# Patient Record
Sex: MALE | Race: WHITE | NOT HISPANIC OR LATINO | Employment: FULL TIME | URBAN - METROPOLITAN AREA
[De-identification: names, ages, dates, MRNs, and addresses within clinical notes are randomized per-mention and may not be internally consistent; named-entity substitution may affect disease eponyms.]

---

## 2023-03-14 LAB
ALANINE AMINOTRANSFERASE (SGPT) (U/L) IN SER/PLAS: 39 U/L (ref 10–52)
ALBUMIN (G/DL) IN SER/PLAS: 4.1 G/DL (ref 3.4–5)
ALKALINE PHOSPHATASE (U/L) IN SER/PLAS: 61 U/L (ref 33–120)
ANION GAP IN SER/PLAS: 12 MMOL/L (ref 10–20)
ASPARTATE AMINOTRANSFERASE (SGOT) (U/L) IN SER/PLAS: 20 U/L (ref 9–39)
BASOPHILS (10*3/UL) IN BLOOD BY AUTOMATED COUNT: 0.02 X10E9/L (ref 0–0.1)
BASOPHILS/100 LEUKOCYTES IN BLOOD BY AUTOMATED COUNT: 0.4 % (ref 0–2)
BILIRUBIN TOTAL (MG/DL) IN SER/PLAS: 0.6 MG/DL (ref 0–1.2)
CALCIUM (MG/DL) IN SER/PLAS: 8.8 MG/DL (ref 8.6–10.3)
CARBON DIOXIDE, TOTAL (MMOL/L) IN SER/PLAS: 25 MMOL/L (ref 21–32)
CHLORIDE (MMOL/L) IN SER/PLAS: 105 MMOL/L (ref 98–107)
CREATININE (MG/DL) IN SER/PLAS: 0.73 MG/DL (ref 0.5–1.3)
EOSINOPHILS (10*3/UL) IN BLOOD BY AUTOMATED COUNT: 0.07 X10E9/L (ref 0–0.7)
EOSINOPHILS/100 LEUKOCYTES IN BLOOD BY AUTOMATED COUNT: 1.3 % (ref 0–6)
ERYTHROCYTE DISTRIBUTION WIDTH (RATIO) BY AUTOMATED COUNT: 13.2 % (ref 11.5–14.5)
ERYTHROCYTE MEAN CORPUSCULAR HEMOGLOBIN CONCENTRATION (G/DL) BY AUTOMATED: 32.6 G/DL (ref 32–36)
ERYTHROCYTE MEAN CORPUSCULAR VOLUME (FL) BY AUTOMATED COUNT: 87 FL (ref 80–100)
ERYTHROCYTES (10*6/UL) IN BLOOD BY AUTOMATED COUNT: 4.78 X10E12/L (ref 4.5–5.9)
GFR MALE: >90 ML/MIN/1.73M2
GLUCOSE (MG/DL) IN SER/PLAS: 98 MG/DL (ref 74–99)
HEMATOCRIT (%) IN BLOOD BY AUTOMATED COUNT: 41.7 % (ref 41–52)
HEMOGLOBIN (G/DL) IN BLOOD: 13.6 G/DL (ref 13.5–17.5)
IMMATURE GRANULOCYTES/100 LEUKOCYTES IN BLOOD BY AUTOMATED COUNT: 0.2 % (ref 0–0.9)
LEUKOCYTES (10*3/UL) IN BLOOD BY AUTOMATED COUNT: 5.5 X10E9/L (ref 4.4–11.3)
LYMPHOCYTES (10*3/UL) IN BLOOD BY AUTOMATED COUNT: 1.69 X10E9/L (ref 1.2–4.8)
LYMPHOCYTES/100 LEUKOCYTES IN BLOOD BY AUTOMATED COUNT: 30.5 % (ref 13–44)
MONOCYTES (10*3/UL) IN BLOOD BY AUTOMATED COUNT: 0.42 X10E9/L (ref 0.1–1)
MONOCYTES/100 LEUKOCYTES IN BLOOD BY AUTOMATED COUNT: 7.6 % (ref 2–10)
NEUTROPHILS (10*3/UL) IN BLOOD BY AUTOMATED COUNT: 3.33 X10E9/L (ref 1.2–7.7)
NEUTROPHILS/100 LEUKOCYTES IN BLOOD BY AUTOMATED COUNT: 60 % (ref 40–80)
PLATELETS (10*3/UL) IN BLOOD AUTOMATED COUNT: 242 X10E9/L (ref 150–450)
POTASSIUM (MMOL/L) IN SER/PLAS: 3.9 MMOL/L (ref 3.5–5.3)
PROTEIN TOTAL: 6.6 G/DL (ref 6.4–8.2)
SODIUM (MMOL/L) IN SER/PLAS: 138 MMOL/L (ref 136–145)
UREA NITROGEN (MG/DL) IN SER/PLAS: 14 MG/DL (ref 6–23)

## 2023-03-16 LAB — STAPH/MRSA SCREEN, CULTURE: NORMAL

## 2023-03-20 LAB
ESTIMATED AVERAGE GLUCOSE FOR HBA1C: 120 MG/DL
HEMOGLOBIN A1C/HEMOGLOBIN TOTAL IN BLOOD: 5.8 %

## 2023-03-24 LAB — SARS-COV-2 RESULT: NOT DETECTED

## 2023-03-28 ENCOUNTER — HOSPITAL ENCOUNTER (OUTPATIENT)
Dept: DATA CONVERSION | Facility: HOSPITAL | Age: 58
End: 2023-03-28
Attending: ORTHOPAEDIC SURGERY | Admitting: ORTHOPAEDIC SURGERY
Payer: COMMERCIAL

## 2023-03-28 DIAGNOSIS — M17.10 UNILATERAL PRIMARY OSTEOARTHRITIS, UNSPECIFIED KNEE: ICD-10-CM

## 2023-03-28 DIAGNOSIS — E66.9 OBESITY, UNSPECIFIED: ICD-10-CM

## 2023-03-28 DIAGNOSIS — Z79.82 LONG TERM (CURRENT) USE OF ASPIRIN: ICD-10-CM

## 2023-03-28 DIAGNOSIS — M17.11 UNILATERAL PRIMARY OSTEOARTHRITIS, RIGHT KNEE: ICD-10-CM

## 2023-03-28 LAB
POCT GLUCOSE: 109 MG/DL (ref 74–99)
POCT GLUCOSE: 118 MG/DL (ref 74–99)

## 2023-03-29 LAB
ANION GAP IN SER/PLAS: NORMAL
BASOPHILS (10*3/UL) IN BLOOD BY AUTOMATED COUNT: NORMAL
BASOPHILS/100 LEUKOCYTES IN BLOOD BY AUTOMATED COUNT: NORMAL
CALCIUM (MG/DL) IN SER/PLAS: NORMAL
CARBON DIOXIDE, TOTAL (MMOL/L) IN SER/PLAS: NORMAL
CHLORIDE (MMOL/L) IN SER/PLAS: NORMAL
CREATININE (MG/DL) IN SER/PLAS: NORMAL
EOSINOPHILS (10*3/UL) IN BLOOD BY AUTOMATED COUNT: NORMAL
EOSINOPHILS/100 LEUKOCYTES IN BLOOD BY AUTOMATED COUNT: NORMAL
ERYTHROCYTE DISTRIBUTION WIDTH (RATIO) BY AUTOMATED COUNT: NORMAL
ERYTHROCYTE MEAN CORPUSCULAR HEMOGLOBIN CONCENTRATION (G/DL) BY AUTOMATED: NORMAL
ERYTHROCYTE MEAN CORPUSCULAR VOLUME (FL) BY AUTOMATED COUNT: NORMAL
ERYTHROCYTES (10*6/UL) IN BLOOD BY AUTOMATED COUNT: NORMAL
GFR FEMALE: NORMAL
GFR MALE: NORMAL
GLUCOSE (MG/DL) IN SER/PLAS: NORMAL
HEMATOCRIT (%) IN BLOOD BY AUTOMATED COUNT: NORMAL
HEMOGLOBIN (G/DL) IN BLOOD: NORMAL
IMMATURE GRANULOCYTES/100 LEUKOCYTES IN BLOOD BY AUTOMATED COUNT: NORMAL
LEUKOCYTES (10*3/UL) IN BLOOD BY AUTOMATED COUNT: NORMAL
LYMPHOCYTES (10*3/UL) IN BLOOD BY AUTOMATED COUNT: NORMAL
LYMPHOCYTES/100 LEUKOCYTES IN BLOOD BY AUTOMATED COUNT: NORMAL
MANUAL DIFFERENTIAL Y/N: NORMAL
MONOCYTES (10*3/UL) IN BLOOD BY AUTOMATED COUNT: NORMAL
MONOCYTES/100 LEUKOCYTES IN BLOOD BY AUTOMATED COUNT: NORMAL
NEUTROPHILS (10*3/UL) IN BLOOD BY AUTOMATED COUNT: NORMAL
NEUTROPHILS/100 LEUKOCYTES IN BLOOD BY AUTOMATED COUNT: NORMAL
NRBC (PER 100 WBCS) BY AUTOMATED COUNT: NORMAL
PLATELETS (10*3/UL) IN BLOOD AUTOMATED COUNT: NORMAL
POTASSIUM (MMOL/L) IN SER/PLAS: NORMAL
SODIUM (MMOL/L) IN SER/PLAS: NORMAL
UREA NITROGEN (MG/DL) IN SER/PLAS: NORMAL

## 2023-09-07 VITALS — HEIGHT: 69 IN | WEIGHT: 253.31 LBS | BODY MASS INDEX: 37.52 KG/M2

## 2023-09-14 NOTE — H&P
History & Physical Reviewed:   I have reviewed the History and Physical dated:  14-Mar-2023   History and Physical reviewed and relevant findings noted. Patient examined to review pertinent physical  findings.: No significant changes   Home Medications Reviewed: no changes noted   Allergies Reviewed: no changes noted       ERAS (Enhanced Recovery After Surgery):  ·  ERAS Patient: no     Consent:   COVID-19 Consent:  ·  COVID-19 Risk Consent Surgeon has reviewed key risks related to the risk of jacklyn COVID-19 and if they contract COVID-19 what the risks are.       Electronic Signatures:  Valreiano Beatty)  (Signed 27-Mar-2023 13:09)   Authored: History & Physical Reviewed, ERAS, Consent,  Note Completion      Last Updated: 27-Mar-2023 13:09 by Valeriano Beatty)

## 2023-09-28 LAB
ANION GAP IN SER/PLAS: 12 MMOL/L (ref 10–20)
BASOPHILS (10*3/UL) IN BLOOD BY AUTOMATED COUNT: 0.02 X10E9/L (ref 0–0.1)
BASOPHILS/100 LEUKOCYTES IN BLOOD BY AUTOMATED COUNT: 0.3 % (ref 0–2)
CALCIUM (MG/DL) IN SER/PLAS: 9.1 MG/DL (ref 8.6–10.3)
CARBON DIOXIDE, TOTAL (MMOL/L) IN SER/PLAS: 26 MMOL/L (ref 21–32)
CHLORIDE (MMOL/L) IN SER/PLAS: 103 MMOL/L (ref 98–107)
CREATININE (MG/DL) IN SER/PLAS: 0.78 MG/DL (ref 0.5–1.3)
EOSINOPHILS (10*3/UL) IN BLOOD BY AUTOMATED COUNT: 0.09 X10E9/L (ref 0–0.7)
EOSINOPHILS/100 LEUKOCYTES IN BLOOD BY AUTOMATED COUNT: 1.6 % (ref 0–6)
ERYTHROCYTE DISTRIBUTION WIDTH (RATIO) BY AUTOMATED COUNT: 14 % (ref 11.5–14.5)
ERYTHROCYTE MEAN CORPUSCULAR HEMOGLOBIN CONCENTRATION (G/DL) BY AUTOMATED: 32.5 G/DL (ref 32–36)
ERYTHROCYTE MEAN CORPUSCULAR VOLUME (FL) BY AUTOMATED COUNT: 87 FL (ref 80–100)
ERYTHROCYTES (10*6/UL) IN BLOOD BY AUTOMATED COUNT: 5 X10E12/L (ref 4.5–5.9)
GFR MALE: >90 ML/MIN/1.73M2
GLUCOSE (MG/DL) IN SER/PLAS: 105 MG/DL (ref 74–99)
HEMATOCRIT (%) IN BLOOD BY AUTOMATED COUNT: 43.4 % (ref 41–52)
HEMOGLOBIN (G/DL) IN BLOOD: 14.1 G/DL (ref 13.5–17.5)
IMMATURE GRANULOCYTES/100 LEUKOCYTES IN BLOOD BY AUTOMATED COUNT: 0.3 % (ref 0–0.9)
LEUKOCYTES (10*3/UL) IN BLOOD BY AUTOMATED COUNT: 5.8 X10E9/L (ref 4.4–11.3)
LYMPHOCYTES (10*3/UL) IN BLOOD BY AUTOMATED COUNT: 2.08 X10E9/L (ref 1.2–4.8)
LYMPHOCYTES/100 LEUKOCYTES IN BLOOD BY AUTOMATED COUNT: 36 % (ref 13–44)
MONOCYTES (10*3/UL) IN BLOOD BY AUTOMATED COUNT: 0.51 X10E9/L (ref 0.1–1)
MONOCYTES/100 LEUKOCYTES IN BLOOD BY AUTOMATED COUNT: 8.8 % (ref 2–10)
NEUTROPHILS (10*3/UL) IN BLOOD BY AUTOMATED COUNT: 3.05 X10E9/L (ref 1.2–7.7)
NEUTROPHILS/100 LEUKOCYTES IN BLOOD BY AUTOMATED COUNT: 53 % (ref 40–80)
PLATELETS (10*3/UL) IN BLOOD AUTOMATED COUNT: 278 X10E9/L (ref 150–450)
POTASSIUM (MMOL/L) IN SER/PLAS: 4 MMOL/L (ref 3.5–5.3)
SODIUM (MMOL/L) IN SER/PLAS: 137 MMOL/L (ref 136–145)
UREA NITROGEN (MG/DL) IN SER/PLAS: 14 MG/DL (ref 6–23)

## 2023-09-30 LAB — STAPH/MRSA SCREEN, CULTURE: NORMAL

## 2023-10-02 NOTE — OP NOTE
Post Operative Note:     Post-Procedure Diagnosis: End-stage arthrosis right  knee   Procedure: Right total knee replacement   Surgeon: Jaci   Resident/Fellow/Other Assistant: Reema Ortiz SA   Anesthesia: Spinal and regional   Estimated Blood Loss (mL): none   Specimen: no   Findings: See above diagnosis     Operative Report Dictated:  Dictation: not applicable - note contains Operative  Report   Operative Report:    Preop DX:  End-stage DJD right knee   Postop DX: Same  Procedure: Right total knee replacement  Surgeon: Valeriano Beatty MD  Asst: Bonifacio Wilde MD; Lakhwinder Ortiz SA  Anesthesia: Spinal and regional  Implants: DePuy Attune knee:  femur PS 7 tibia 6 poly-6 mm, patellar button 41 mm.  Clinical note: 58-year-old male end-stage arthrosis right knee.  Failed conservative treatment.  Here for knee replacement.  Understood the risk of surgery including bleeding, infection, DVT, wound healing problems, stiffness, possible need for further  surgery or manipulation.  Understood these risks and wished to proceed.  Procedure Note: Patient brought to operating room after regional anesthesia.  Timeout performed.   Antibiotics and 1 g TXA given IV.  Spinal anesthesia given.  Right leg was prepped and draped in typical sterile fashion with a tourniquet on the thigh.  Leg was exsanguinated and thigh tourniquet was inflated to 325 mmHg.  Anterior approach to the knee  was performed : incision made through skin down to extensor mechanism.  Full-thickness flap elevated off the retinaculum.  Medial parapatellar arthrotomy was performed.  Grade 4 changes were noted.    Provisional medial and lateral release was performed.   Provisional medial and lateral meniscus were excised.  Distal femoral bone cuts were performed.  Size 7 PS trial fit nicely.  Tibia was subluxed.  Tibial cut was performed and  size 6 trial fit nicely.  Was reduced with a 6 mm millimeter poly.  Knee  came to full extension.  Good medial lateral stability.   Patella was cut freehand.  Sized for a 41 mm button.  Patella button tracked centrally with no touch technique.  Femoral rotation lugs were drilled.  Proximal tibia was drilled and keel cut.  Bone  was pulse lavaged and dried.  Components were placed with cement.    Knee was in full extension while cement polymerized.  Wound was irrigated.  Arthrotomy repaired with interrupted suture.  Wound was closed in layers.  Dressed with soft compressive dressing.   Patient tolerated procedure well was taken recovery room in stable condition.          Electronic Signatures:  Valeriano Beatty)  (Signed 28-Mar-2023 10:17)   Authored: Post Operative Note, Note Completion      Last Updated: 28-Mar-2023 10:17 by Valeriano Beatty)

## 2023-10-06 PROBLEM — M19.90 ARTHRITIS: Status: ACTIVE | Noted: 2023-10-06

## 2023-10-09 ENCOUNTER — ANESTHESIA EVENT (OUTPATIENT)
Dept: OPERATING ROOM | Facility: HOSPITAL | Age: 58
End: 2023-10-09
Payer: COMMERCIAL

## 2023-10-10 ENCOUNTER — ANESTHESIA (OUTPATIENT)
Dept: OPERATING ROOM | Facility: HOSPITAL | Age: 58
End: 2023-10-10
Payer: COMMERCIAL

## 2023-10-10 ENCOUNTER — APPOINTMENT (OUTPATIENT)
Dept: RADIOLOGY | Facility: HOSPITAL | Age: 58
End: 2023-10-10
Payer: COMMERCIAL

## 2023-10-10 ENCOUNTER — PHARMACY VISIT (OUTPATIENT)
Dept: PHARMACY | Facility: CLINIC | Age: 58
End: 2023-10-10
Payer: COMMERCIAL

## 2023-10-10 ENCOUNTER — HOSPITAL ENCOUNTER (OUTPATIENT)
Facility: HOSPITAL | Age: 58
Discharge: HOME | End: 2023-10-11
Attending: ORTHOPAEDIC SURGERY | Admitting: ORTHOPAEDIC SURGERY
Payer: COMMERCIAL

## 2023-10-10 DIAGNOSIS — M17.10 ARTHRITIS OF KNEE: Primary | ICD-10-CM

## 2023-10-10 PROBLEM — E66.9 OBESITY: Status: ACTIVE | Noted: 2023-10-10

## 2023-10-10 PROBLEM — N20.0 NEPHROLITHIASIS: Status: ACTIVE | Noted: 2023-10-10

## 2023-10-10 PROCEDURE — 3600000005 HC OR TIME - INITIAL BASE CHARGE - PROCEDURE LEVEL FIVE: Performed by: ORTHOPAEDIC SURGERY

## 2023-10-10 PROCEDURE — A27447 PR TOTAL KNEE ARTHROPLASTY: Performed by: ANESTHESIOLOGY

## 2023-10-10 PROCEDURE — 7100000001 HC RECOVERY ROOM TIME - INITIAL BASE CHARGE: Performed by: ORTHOPAEDIC SURGERY

## 2023-10-10 PROCEDURE — 73560 X-RAY EXAM OF KNEE 1 OR 2: CPT | Mod: LT

## 2023-10-10 PROCEDURE — 7100000011 HC EXTENDED STAY RECOVERY HOURLY - NURSING UNIT

## 2023-10-10 PROCEDURE — 97161 PT EVAL LOW COMPLEX 20 MIN: CPT | Mod: GP

## 2023-10-10 PROCEDURE — 3700000001 HC GENERAL ANESTHESIA TIME - INITIAL BASE CHARGE: Performed by: ORTHOPAEDIC SURGERY

## 2023-10-10 PROCEDURE — 2580000001 HC RX 258 IV SOLUTIONS: Performed by: ORTHOPAEDIC SURGERY

## 2023-10-10 PROCEDURE — RXMED WILLOW AMBULATORY MEDICATION CHARGE

## 2023-10-10 PROCEDURE — 2780000003 HC OR 278 NO HCPCS: Performed by: ORTHOPAEDIC SURGERY

## 2023-10-10 PROCEDURE — A27447 PR TOTAL KNEE ARTHROPLASTY: Performed by: ANESTHESIOLOGIST ASSISTANT

## 2023-10-10 PROCEDURE — 3600000010 HC OR TIME - EACH INCREMENTAL 1 MINUTE - PROCEDURE LEVEL FIVE: Performed by: ORTHOPAEDIC SURGERY

## 2023-10-10 PROCEDURE — 2500000001 HC RX 250 WO HCPCS SELF ADMINISTERED DRUGS (ALT 637 FOR MEDICARE OP): Performed by: ORTHOPAEDIC SURGERY

## 2023-10-10 PROCEDURE — 7100000002 HC RECOVERY ROOM TIME - EACH INCREMENTAL 1 MINUTE: Performed by: ORTHOPAEDIC SURGERY

## 2023-10-10 PROCEDURE — 2500000004 HC RX 250 GENERAL PHARMACY W/ HCPCS (ALT 636 FOR OP/ED): Performed by: ANESTHESIOLOGIST ASSISTANT

## 2023-10-10 PROCEDURE — 2500000001 HC RX 250 WO HCPCS SELF ADMINISTERED DRUGS (ALT 637 FOR MEDICARE OP): Performed by: ANESTHESIOLOGY

## 2023-10-10 PROCEDURE — 2580000001 HC RX 258 IV SOLUTIONS: Performed by: ANESTHESIOLOGIST ASSISTANT

## 2023-10-10 PROCEDURE — 2500000004 HC RX 250 GENERAL PHARMACY W/ HCPCS (ALT 636 FOR OP/ED): Performed by: ANESTHESIOLOGY

## 2023-10-10 PROCEDURE — 3700000002 HC GENERAL ANESTHESIA TIME - EACH INCREMENTAL 1 MINUTE: Performed by: ORTHOPAEDIC SURGERY

## 2023-10-10 PROCEDURE — 27447 TOTAL KNEE ARTHROPLASTY: CPT | Performed by: ORTHOPAEDIC SURGERY

## 2023-10-10 PROCEDURE — 2720000007 HC OR 272 NO HCPCS: Performed by: ORTHOPAEDIC SURGERY

## 2023-10-10 PROCEDURE — 97110 THERAPEUTIC EXERCISES: CPT | Mod: GP

## 2023-10-10 PROCEDURE — 76942 ECHO GUIDE FOR BIOPSY: CPT | Performed by: ANESTHESIOLOGY

## 2023-10-10 PROCEDURE — C1776 JOINT DEVICE (IMPLANTABLE): HCPCS | Performed by: ORTHOPAEDIC SURGERY

## 2023-10-10 PROCEDURE — 2500000005 HC RX 250 GENERAL PHARMACY W/O HCPCS: Performed by: ANESTHESIOLOGIST ASSISTANT

## 2023-10-10 PROCEDURE — 73560 X-RAY EXAM OF KNEE 1 OR 2: CPT | Mod: LEFT SIDE | Performed by: RADIOLOGY

## 2023-10-10 PROCEDURE — 2500000005 HC RX 250 GENERAL PHARMACY W/O HCPCS: Performed by: ORTHOPAEDIC SURGERY

## 2023-10-10 PROCEDURE — C1713 ANCHOR/SCREW BN/BN,TIS/BN: HCPCS | Performed by: ORTHOPAEDIC SURGERY

## 2023-10-10 PROCEDURE — A6213 FOAM DRG >16<=48 SQ IN W/BDR: HCPCS | Performed by: ORTHOPAEDIC SURGERY

## 2023-10-10 PROCEDURE — 2500000004 HC RX 250 GENERAL PHARMACY W/ HCPCS (ALT 636 FOR OP/ED): Performed by: ORTHOPAEDIC SURGERY

## 2023-10-10 DEVICE — DOME, PATELLA, MEDIALIZED, 38MM: Type: IMPLANTABLE DEVICE | Site: KNEE | Status: FUNCTIONAL

## 2023-10-10 DEVICE — TIBAL BASE ATTUNE FB, SZ 6 CEM: Type: IMPLANTABLE DEVICE | Site: KNEE | Status: FUNCTIONAL

## 2023-10-10 DEVICE — INSERT, ATTUNE PS FB, SZ 6, 5MM: Type: IMPLANTABLE DEVICE | Site: KNEE | Status: FUNCTIONAL

## 2023-10-10 DEVICE — BONE CEMENT, CMW 2 , FAST SET, 20G: Type: IMPLANTABLE DEVICE | Site: KNEE | Status: FUNCTIONAL

## 2023-10-10 DEVICE — IMPLANTABLE DEVICE: Type: IMPLANTABLE DEVICE | Site: KNEE | Status: FUNCTIONAL

## 2023-10-10 RX ORDER — IBUPROFEN 200 MG
200 TABLET ORAL EVERY 6 HOURS PRN
COMMUNITY
Start: 2019-06-12 | End: 2023-10-11 | Stop reason: HOSPADM

## 2023-10-10 RX ORDER — NAPROXEN SODIUM 220 MG/1
81 TABLET, FILM COATED ORAL 2 TIMES DAILY
Qty: 28 TABLET | Refills: 0 | Status: SHIPPED | OUTPATIENT
Start: 2023-10-10 | End: 2023-10-24

## 2023-10-10 RX ORDER — BUPIVACAINE HYDROCHLORIDE AND EPINEPHRINE 5; 5 MG/ML; UG/ML
INJECTION, SOLUTION EPIDURAL; INTRACAUDAL; PERINEURAL
Status: DISPENSED
Start: 2023-10-10 | End: 2023-10-10

## 2023-10-10 RX ORDER — MIDAZOLAM HYDROCHLORIDE 1 MG/ML
INJECTION INTRAMUSCULAR; INTRAVENOUS
Status: DISPENSED
Start: 2023-10-10 | End: 2023-10-10

## 2023-10-10 RX ORDER — SODIUM CHLORIDE, SODIUM LACTATE, POTASSIUM CHLORIDE, CALCIUM CHLORIDE 600; 310; 30; 20 MG/100ML; MG/100ML; MG/100ML; MG/100ML
50 INJECTION, SOLUTION INTRAVENOUS CONTINUOUS
Status: DISCONTINUED | OUTPATIENT
Start: 2023-10-10 | End: 2023-10-11 | Stop reason: HOSPADM

## 2023-10-10 RX ORDER — ASPIRIN 81 MG/1
81 TABLET ORAL 2 TIMES DAILY
Status: DISCONTINUED | OUTPATIENT
Start: 2023-10-10 | End: 2023-10-11 | Stop reason: HOSPADM

## 2023-10-10 RX ORDER — CYCLOBENZAPRINE HCL 10 MG
10 TABLET ORAL 3 TIMES DAILY PRN
Status: DISCONTINUED | OUTPATIENT
Start: 2023-10-10 | End: 2023-10-11 | Stop reason: HOSPADM

## 2023-10-10 RX ORDER — SODIUM CHLORIDE, SODIUM LACTATE, POTASSIUM CHLORIDE, CALCIUM CHLORIDE 600; 310; 30; 20 MG/100ML; MG/100ML; MG/100ML; MG/100ML
100 INJECTION, SOLUTION INTRAVENOUS CONTINUOUS
Status: DISCONTINUED | OUTPATIENT
Start: 2023-10-10 | End: 2023-10-10 | Stop reason: HOSPADM

## 2023-10-10 RX ORDER — PROPOFOL 10 MG/ML
INJECTION, EMULSION INTRAVENOUS CONTINUOUS PRN
Status: DISCONTINUED | OUTPATIENT
Start: 2023-10-10 | End: 2023-10-10

## 2023-10-10 RX ORDER — LORATADINE 10 MG/1
10 TABLET ORAL
COMMUNITY
Start: 2022-10-13

## 2023-10-10 RX ORDER — NALOXONE HYDROCHLORIDE 1 MG/ML
0.2 INJECTION INTRAMUSCULAR; INTRAVENOUS; SUBCUTANEOUS EVERY 5 MIN PRN
Status: DISCONTINUED | OUTPATIENT
Start: 2023-10-10 | End: 2023-10-11 | Stop reason: HOSPADM

## 2023-10-10 RX ORDER — ONDANSETRON HYDROCHLORIDE 2 MG/ML
4 INJECTION, SOLUTION INTRAVENOUS ONCE AS NEEDED
Status: COMPLETED | OUTPATIENT
Start: 2023-10-10 | End: 2023-10-10

## 2023-10-10 RX ORDER — LORATADINE 10 MG/1
10 TABLET ORAL
Status: DISCONTINUED | OUTPATIENT
Start: 2023-10-10 | End: 2023-10-11 | Stop reason: HOSPADM

## 2023-10-10 RX ORDER — CEFAZOLIN 1 G/1
INJECTION, POWDER, FOR SOLUTION INTRAVENOUS AS NEEDED
Status: DISCONTINUED | OUTPATIENT
Start: 2023-10-10 | End: 2023-10-10

## 2023-10-10 RX ORDER — TRANEXAMIC ACID 100 MG/ML
INJECTION, SOLUTION INTRAVENOUS AS NEEDED
Status: DISCONTINUED | OUTPATIENT
Start: 2023-10-10 | End: 2023-10-10

## 2023-10-10 RX ORDER — MIDAZOLAM HYDROCHLORIDE 1 MG/ML
INJECTION, SOLUTION INTRAMUSCULAR; INTRAVENOUS AS NEEDED
Status: DISCONTINUED | OUTPATIENT
Start: 2023-10-10 | End: 2023-10-10

## 2023-10-10 RX ORDER — DOCUSATE SODIUM 100 MG/1
100 CAPSULE, LIQUID FILLED ORAL 2 TIMES DAILY
Status: DISCONTINUED | OUTPATIENT
Start: 2023-10-10 | End: 2023-10-11 | Stop reason: HOSPADM

## 2023-10-10 RX ORDER — DEXAMETHASONE SODIUM PHOSPHATE 4 MG/ML
INJECTION, SOLUTION INTRA-ARTICULAR; INTRALESIONAL; INTRAMUSCULAR; INTRAVENOUS; SOFT TISSUE
Status: DISPENSED
Start: 2023-10-10 | End: 2023-10-10

## 2023-10-10 RX ORDER — OXYCODONE HYDROCHLORIDE 5 MG/1
5 TABLET ORAL EVERY 6 HOURS PRN
Status: DISCONTINUED | OUTPATIENT
Start: 2023-10-10 | End: 2023-10-11 | Stop reason: HOSPADM

## 2023-10-10 RX ORDER — POLYETHYLENE GLYCOL 3350 17 G/17G
17 POWDER, FOR SOLUTION ORAL DAILY
Status: DISCONTINUED | OUTPATIENT
Start: 2023-10-10 | End: 2023-10-11 | Stop reason: HOSPADM

## 2023-10-10 RX ORDER — TRANEXAMIC ACID 100 MG/ML
1000 INJECTION, SOLUTION INTRAVENOUS ONCE
Status: DISCONTINUED | OUTPATIENT
Start: 2023-10-10 | End: 2023-10-10 | Stop reason: SDUPTHER

## 2023-10-10 RX ORDER — OXYCODONE HYDROCHLORIDE 5 MG/1
10 TABLET ORAL EVERY 4 HOURS PRN
Status: DISCONTINUED | OUTPATIENT
Start: 2023-10-10 | End: 2023-10-11 | Stop reason: HOSPADM

## 2023-10-10 RX ORDER — MULTIVIT-MIN/IRON FUM/FOLIC AC 7.5 MG-4
1 TABLET ORAL DAILY
Status: DISCONTINUED | OUTPATIENT
Start: 2023-10-10 | End: 2023-10-11 | Stop reason: HOSPADM

## 2023-10-10 RX ORDER — BISACODYL 5 MG/1
1 TABLET, COATED ORAL DAILY
COMMUNITY
Start: 2023-02-01

## 2023-10-10 RX ORDER — OXYCODONE HYDROCHLORIDE 5 MG/1
5 TABLET ORAL EVERY 6 HOURS
Qty: 28 TABLET | Refills: 0 | Status: SHIPPED | OUTPATIENT
Start: 2023-10-10 | End: 2023-10-17 | Stop reason: SDUPTHER

## 2023-10-10 RX ORDER — TALC
10 POWDER (GRAM) TOPICAL DAILY PRN
Status: DISCONTINUED | OUTPATIENT
Start: 2023-10-10 | End: 2023-10-11 | Stop reason: HOSPADM

## 2023-10-10 RX ORDER — OXYCODONE HYDROCHLORIDE 5 MG/1
5 TABLET ORAL EVERY 4 HOURS PRN
Status: DISCONTINUED | OUTPATIENT
Start: 2023-10-10 | End: 2023-10-10 | Stop reason: HOSPADM

## 2023-10-10 RX ORDER — DOCUSATE SODIUM 100 MG/1
100 CAPSULE, LIQUID FILLED ORAL 2 TIMES DAILY
Qty: 28 CAPSULE | Refills: 0 | Status: SHIPPED | OUTPATIENT
Start: 2023-10-10 | End: 2023-10-24

## 2023-10-10 RX ORDER — ROPIVACAINE/EPI/CLONIDINE/KET 2.46-0.005
SYRINGE (ML) INJECTION AS NEEDED
Status: DISCONTINUED | OUTPATIENT
Start: 2023-10-10 | End: 2023-10-10 | Stop reason: HOSPADM

## 2023-10-10 RX ORDER — CEFAZOLIN SODIUM 2 G/100ML
2 INJECTION, SOLUTION INTRAVENOUS EVERY 8 HOURS
Status: COMPLETED | OUTPATIENT
Start: 2023-10-10 | End: 2023-10-11

## 2023-10-10 RX ORDER — LIDOCAINE HYDROCHLORIDE 20 MG/ML
INJECTION, SOLUTION EPIDURAL; INFILTRATION; INTRACAUDAL; PERINEURAL AS NEEDED
Status: DISCONTINUED | OUTPATIENT
Start: 2023-10-10 | End: 2023-10-10

## 2023-10-10 RX ORDER — TRAMADOL HYDROCHLORIDE 50 MG/1
100 TABLET ORAL EVERY 6 HOURS PRN
Qty: 28 TABLET | Refills: 0 | Status: SHIPPED | OUTPATIENT
Start: 2023-10-10 | End: 2023-10-20 | Stop reason: SDUPTHER

## 2023-10-10 RX ORDER — ACETAMINOPHEN 325 MG/1
650 TABLET ORAL EVERY 6 HOURS SCHEDULED
Status: DISCONTINUED | OUTPATIENT
Start: 2023-10-10 | End: 2023-10-11 | Stop reason: HOSPADM

## 2023-10-10 RX ORDER — MEPERIDINE HYDROCHLORIDE 25 MG/ML
12.5 INJECTION INTRAMUSCULAR; INTRAVENOUS; SUBCUTANEOUS EVERY 10 MIN PRN
Status: DISCONTINUED | OUTPATIENT
Start: 2023-10-10 | End: 2023-10-10 | Stop reason: HOSPADM

## 2023-10-10 RX ORDER — PHENYLEPHRINE HCL IN 0.9% NACL 1 MG/10 ML
SYRINGE (ML) INTRAVENOUS AS NEEDED
Status: DISCONTINUED | OUTPATIENT
Start: 2023-10-10 | End: 2023-10-10

## 2023-10-10 RX ORDER — ACETAMINOPHEN, DIPHENHYDRAMINE HCL, PHENYLEPHRINE HCL 325; 25; 5 MG/1; MG/1; MG/1
10 TABLET ORAL DAILY PRN
COMMUNITY
Start: 2023-02-01

## 2023-10-10 RX ORDER — CEFAZOLIN SODIUM 2 G/100ML
2 INJECTION, SOLUTION INTRAVENOUS ONCE
Status: DISCONTINUED | OUTPATIENT
Start: 2023-10-10 | End: 2023-10-10 | Stop reason: SDUPTHER

## 2023-10-10 RX ORDER — MELOXICAM 7.5 MG/1
7.5 TABLET ORAL DAILY
Qty: 14 TABLET | Refills: 0 | Status: SHIPPED | OUTPATIENT
Start: 2023-10-10 | End: 2023-10-24

## 2023-10-10 RX ADMIN — Medication 100 MCG: at 13:03

## 2023-10-10 RX ADMIN — ONDANSETRON 4 MG: 2 INJECTION INTRAMUSCULAR; INTRAVENOUS at 15:54

## 2023-10-10 RX ADMIN — CEFAZOLIN SODIUM 2 G: 2 INJECTION, SOLUTION INTRAVENOUS at 20:57

## 2023-10-10 RX ADMIN — OXYCODONE HYDROCHLORIDE 5 MG: 5 TABLET ORAL at 15:16

## 2023-10-10 RX ADMIN — SODIUM CHLORIDE, POTASSIUM CHLORIDE, SODIUM LACTATE AND CALCIUM CHLORIDE: 600; 310; 30; 20 INJECTION, SOLUTION INTRAVENOUS at 12:36

## 2023-10-10 RX ADMIN — Medication 100 MCG: at 12:52

## 2023-10-10 RX ADMIN — DOCUSATE SODIUM 100 MG: 100 CAPSULE, LIQUID FILLED ORAL at 21:00

## 2023-10-10 RX ADMIN — ACETAMINOPHEN 650 MG: 325 TABLET ORAL at 17:54

## 2023-10-10 RX ADMIN — MIDAZOLAM HYDROCHLORIDE 2 MG: 1 INJECTION, SOLUTION INTRAMUSCULAR; INTRAVENOUS at 09:15

## 2023-10-10 RX ADMIN — SODIUM CHLORIDE, POTASSIUM CHLORIDE, SODIUM LACTATE AND CALCIUM CHLORIDE: 600; 310; 30; 20 INJECTION, SOLUTION INTRAVENOUS at 11:24

## 2023-10-10 RX ADMIN — TRANEXAMIC ACID 1000 MG: 1 INJECTION, SOLUTION INTRAVENOUS at 11:41

## 2023-10-10 RX ADMIN — OXYCODONE HYDROCHLORIDE 10 MG: 5 TABLET ORAL at 21:06

## 2023-10-10 RX ADMIN — SODIUM CHLORIDE, POTASSIUM CHLORIDE, SODIUM LACTATE AND CALCIUM CHLORIDE 50 ML/HR: 600; 310; 30; 20 INJECTION, SOLUTION INTRAVENOUS at 20:55

## 2023-10-10 RX ADMIN — PROPOFOL 200 MCG/KG/MIN: 10 INJECTION, EMULSION INTRAVENOUS at 11:38

## 2023-10-10 RX ADMIN — LIDOCAINE HYDROCHLORIDE 40 MG: 20 INJECTION, SOLUTION EPIDURAL; INFILTRATION; INTRACAUDAL; PERINEURAL at 11:38

## 2023-10-10 RX ADMIN — ASPIRIN 81 MG: 81 TABLET, COATED ORAL at 21:00

## 2023-10-10 RX ADMIN — MEPIVACAINE HYDROCHLORIDE 4 ML: 15 INJECTION, SOLUTION EPIDURAL; INFILTRATION at 11:32

## 2023-10-10 RX ADMIN — CEFAZOLIN 2 G: 1 INJECTION, POWDER, FOR SOLUTION INTRAMUSCULAR; INTRAVENOUS at 11:40

## 2023-10-10 RX ADMIN — HYDROMORPHONE HYDROCHLORIDE 0.5 MG: 1 INJECTION, SOLUTION INTRAMUSCULAR; INTRAVENOUS; SUBCUTANEOUS at 14:49

## 2023-10-10 SDOH — SOCIAL STABILITY: SOCIAL INSECURITY: ARE YOU OR HAVE YOU BEEN THREATENED OR ABUSED PHYSICALLY, EMOTIONALLY, OR SEXUALLY BY ANYONE?: NO

## 2023-10-10 SDOH — SOCIAL STABILITY: SOCIAL INSECURITY: DO YOU FEEL ANYONE HAS EXPLOITED OR TAKEN ADVANTAGE OF YOU FINANCIALLY OR OF YOUR PERSONAL PROPERTY?: NO

## 2023-10-10 SDOH — SOCIAL STABILITY: SOCIAL INSECURITY: ABUSE: ADULT

## 2023-10-10 SDOH — SOCIAL STABILITY: SOCIAL INSECURITY: DOES ANYONE TRY TO KEEP YOU FROM HAVING/CONTACTING OTHER FRIENDS OR DOING THINGS OUTSIDE YOUR HOME?: NO

## 2023-10-10 SDOH — SOCIAL STABILITY: SOCIAL INSECURITY: HAS ANYONE EVER THREATENED TO HURT YOUR FAMILY OR YOUR PETS?: NO

## 2023-10-10 SDOH — SOCIAL STABILITY: SOCIAL INSECURITY: HAVE YOU HAD THOUGHTS OF HARMING ANYONE ELSE?: NO

## 2023-10-10 SDOH — SOCIAL STABILITY: SOCIAL INSECURITY: DO YOU FEEL UNSAFE GOING BACK TO THE PLACE WHERE YOU ARE LIVING?: NO

## 2023-10-10 SDOH — SOCIAL STABILITY: SOCIAL INSECURITY: ARE THERE ANY APPARENT SIGNS OF INJURIES/BEHAVIORS THAT COULD BE RELATED TO ABUSE/NEGLECT?: NO

## 2023-10-10 SDOH — SOCIAL STABILITY: SOCIAL INSECURITY: WERE YOU ABLE TO COMPLETE ALL THE BEHAVIORAL HEALTH SCREENINGS?: YES

## 2023-10-10 ASSESSMENT — PAIN - FUNCTIONAL ASSESSMENT
PAIN_FUNCTIONAL_ASSESSMENT: 0-10

## 2023-10-10 ASSESSMENT — COGNITIVE AND FUNCTIONAL STATUS - GENERAL
CLIMB 3 TO 5 STEPS WITH RAILING: A LITTLE
MOBILITY SCORE: 18
PATIENT BASELINE BEDBOUND: NO
STANDING UP FROM CHAIR USING ARMS: A LITTLE
WALKING IN HOSPITAL ROOM: A LITTLE
TURNING FROM BACK TO SIDE WHILE IN FLAT BAD: A LITTLE
MOVING FROM LYING ON BACK TO SITTING ON SIDE OF FLAT BED WITH BEDRAILS: A LITTLE
TURNING FROM BACK TO SIDE WHILE IN FLAT BAD: A LITTLE
MOBILITY SCORE: 18
MOVING FROM LYING ON BACK TO SITTING ON SIDE OF FLAT BED WITH BEDRAILS: A LITTLE
STANDING UP FROM CHAIR USING ARMS: A LITTLE
DRESSING REGULAR LOWER BODY CLOTHING: A LITTLE
DAILY ACTIVITIY SCORE: 23
MOVING TO AND FROM BED TO CHAIR: A LITTLE
WALKING IN HOSPITAL ROOM: A LITTLE
MOVING TO AND FROM BED TO CHAIR: A LITTLE
CLIMB 3 TO 5 STEPS WITH RAILING: A LITTLE

## 2023-10-10 ASSESSMENT — PAIN SCALES - GENERAL
PAINLEVEL_OUTOF10: 5 - MODERATE PAIN
PAINLEVEL_OUTOF10: 3
PAINLEVEL_OUTOF10: 6
PAINLEVEL_OUTOF10: 5 - MODERATE PAIN
PAINLEVEL_OUTOF10: 0 - NO PAIN
PAINLEVEL_OUTOF10: 5 - MODERATE PAIN
PAINLEVEL_OUTOF10: 3
PAINLEVEL_OUTOF10: 7
PAINLEVEL_OUTOF10: 5 - MODERATE PAIN
PAINLEVEL_OUTOF10: 7
PAINLEVEL_OUTOF10: 7
PAINLEVEL_OUTOF10: 5 - MODERATE PAIN
PAINLEVEL_OUTOF10: 4
PAINLEVEL_OUTOF10: 7
PAINLEVEL_OUTOF10: 5 - MODERATE PAIN
PAINLEVEL_OUTOF10: 4

## 2023-10-10 ASSESSMENT — LIFESTYLE VARIABLES
PRESCIPTION_ABUSE_PAST_12_MONTHS: NO
HOW MANY STANDARD DRINKS CONTAINING ALCOHOL DO YOU HAVE ON A TYPICAL DAY: 1 OR 2
SKIP TO QUESTIONS 9-10: 1
AUDIT-C TOTAL SCORE: 1
HOW MANY STANDARD DRINKS CONTAINING ALCOHOL DO YOU HAVE ON A TYPICAL DAY: 1 OR 2
AUDIT-C TOTAL SCORE: 1
HOW OFTEN DO YOU HAVE SIX OR MORE DRINKS ON ONE OCCASION: NEVER
SKIP TO QUESTIONS 9-10: 1
HOW OFTEN DO YOU HAVE 6 OR MORE DRINKS ON ONE OCCASION: NEVER
HOW OFTEN DO YOU HAVE A DRINK CONTAINING ALCOHOL: MONTHLY OR LESS
SUBSTANCE_ABUSE_PAST_12_MONTHS: NO
HOW OFTEN DO YOU HAVE A DRINK CONTAINING ALCOHOL: MONTHLY OR LESS
AUDIT-C TOTAL SCORE: 1

## 2023-10-10 ASSESSMENT — ACTIVITIES OF DAILY LIVING (ADL)
PATIENT'S MEMORY ADEQUATE TO SAFELY COMPLETE DAILY ACTIVITIES?: YES
HEARING - LEFT EAR: FUNCTIONAL
HEARING - RIGHT EAR: FUNCTIONAL
JUDGMENT_ADEQUATE_SAFELY_COMPLETE_DAILY_ACTIVITIES: YES
DRESSING YOURSELF: INDEPENDENT
BATHING: INDEPENDENT
TOILETING: INDEPENDENT
GROOMING: INDEPENDENT
ADL_ASSISTANCE: INDEPENDENT
WALKS IN HOME: INDEPENDENT
FEEDING YOURSELF: INDEPENDENT
ADEQUATE_TO_COMPLETE_ADL: YES
LACK_OF_TRANSPORTATION: NO

## 2023-10-10 ASSESSMENT — COLUMBIA-SUICIDE SEVERITY RATING SCALE - C-SSRS
2. HAVE YOU ACTUALLY HAD ANY THOUGHTS OF KILLING YOURSELF?: NO
1. IN THE PAST MONTH, HAVE YOU WISHED YOU WERE DEAD OR WISHED YOU COULD GO TO SLEEP AND NOT WAKE UP?: NO
6. HAVE YOU EVER DONE ANYTHING, STARTED TO DO ANYTHING, OR PREPARED TO DO ANYTHING TO END YOUR LIFE?: NO

## 2023-10-10 ASSESSMENT — PATIENT HEALTH QUESTIONNAIRE - PHQ9
2. FEELING DOWN, DEPRESSED OR HOPELESS: NOT AT ALL
SUM OF ALL RESPONSES TO PHQ9 QUESTIONS 1 & 2: 0
1. LITTLE INTEREST OR PLEASURE IN DOING THINGS: NOT AT ALL

## 2023-10-10 ASSESSMENT — PAIN DESCRIPTION - DESCRIPTORS: DESCRIPTORS: ACHING

## 2023-10-10 NOTE — ANESTHESIA PROCEDURE NOTES
Peripheral Block    Patient location during procedure: pre-op  Start time: 10/10/2023 9:15 AM  End time: 10/10/2023 9:30 AM  Reason for block: procedure for pain, at surgeon's request and post-op pain management  Staffing  Performed: attending   Authorized by: Papa Ritter MD    Performed by: Papa Ritter MD  Preanesthetic Checklist  Completed: patient identified, IV checked, site marked, risks and benefits discussed, surgical consent, monitors and equipment checked, pre-op evaluation and timeout performed   Timeout performed at: 10/10/2023 9:15 AM  Peripheral Block  Patient position: laying flat  Prep: ChloraPrep  Patient monitoring: continuous pulse ox  Block type: adductor canal  Laterality: left  Injection technique: single-shot  Guidance: ultrasound guided  Local infiltration: lidocaine  Needle  Needle type: short-bevel   Needle gauge: 21 G  Needle length: 10 cm  Needle localization: ultrasound guidance     image stored in chart  Test dose: negative  Assessment  Injection assessment: negative aspiration for heme, no paresthesia on injection, incremental injection and local visualized surrounding nerve on ultrasound  Heart rate change: no  Slow fractionated injection: yes  Additional Notes  Bupivacaine 0.375% 30ml + dexamethasone 4mg  Versed 2mg iv given

## 2023-10-10 NOTE — OP NOTE
Left Knee Total Replacement (L) Operative Note     Date: 10/10/2023  OR Location: [facility] OR    Name: Chaitanya Elizabeth, : 1965, Age: 58 y.o., MRN: [id number], Sex: male    Diagnosis  Pre-op Diagnosis     * Unilateral primary osteoarthritis, left knee [M17.12] Post-op Diagnosis     * Unilateral primary osteoarthritis, left knee [M17.12]     Procedures    * Left Knee Total Replacement    Surgeons      * Valeriano Beatty - Primary    Resident/Fellow/Other Assistant:  No surgical staff documented.    Procedure Summary  Anesthesia: * No anesthesia type entered *  ASA: II  Anesthesia Staff: Anesthesiologist: Papa Ritter MD  CRNA: Maynor Soria, APRN-CRNA  C-AA: MEY Neville  Estimated Blood Loss: 5 cc mL  Intra-op Medications: * No intraprocedure medications in log *           Anesthesia Record               Intraprocedure I/O Totals          Intake    LR 1000.00 mL    Propofol Drip 0.00 mL    The total shown is the total volume documented since Anesthesia Start was filed.    Phenylephrine Drip 0.00 mL    The total shown is the total volume documented since Anesthesia Start was filed.    Total Intake 1000 mL          Specimen: No specimens collected     Staff:   Circulator: Tiffani Faith RN; Alana Samano RN  Relief Circulator: Patrizia King RN  Relief Scrub: Roberta Navarro RN  Scrub Person: Nita Steinberg; Claire Lang         Drains and/or Catheters: * None in log *    Tourniquet Times:         Implants:  Implants       Type Name Action Serial No.      Joint Knee BONE CEMENT, CMW 2 , FAST SET, 20G - SN/A - [id number] Implanted N/A     Joint Knee BONE CEMENT, CMW 2 , FAST SET, 20G - [id number] Implanted N/A     Joint Knee DOME, PATELLA, MEDIALIZED, 38MM - SN/A - [id number] Implanted N/A     Joint Knee INSERT, ATTUNE PS FB, SZ 6, 5MM - SN/A - [id number] Implanted N/A     Joint Knee TIBAL BASE ATTUNE FB, SZ 6 MORALES - SN/A - [id number] Implanted N/A     Joint Knee FEMORAL, ATTUNE PS, MORALES, SZ 6, LT - SN/A - [id number]  Implanted N/A              Findings: End-stage arthrosis left knee    Indications: Chaitanya Elizabeth is an 58 y.o. male who is having surgery for Unilateral primary osteoarthritis, left knee [M17.12].  End-stage arthrosis left knee.  Status post right total knee.  Failed conservative treatment.    The patient was seen in the preoperative area. The risks, benefits, complications, treatment options, non-operative alternatives, expected recovery and outcomes were discussed with the patient. The possibilities of reaction to medication, pulmonary aspiration, injury to surrounding structures, bleeding, recurrent infection, the need for additional procedures, failure to diagnose a condition, and creating a complication requiring transfusion or operation were discussed with the patient. The patient concurred with the proposed plan, giving informed consent.  The site of surgery was properly noted/marked if necessary per policy. The patient has been actively warmed in preoperative area. Preoperative antibiotics have been ordered and given within 1 hours of incision. Venous thrombosis prophylaxis are not indicated.    Procedure Details: Preop DX:  End good-stage DJD left knee   Postop DX: Same  Procedure: Left total knee replacement  Surgeon: Valeriano Beatty MD  Asst: Meliton De Santiago MD  Anesthesia: Spinal / General and regional  Implants: DePuy [place]ochoo knee:  femur 6 PS tibia 6 poly-5 mm, patellar button 38 mm.  Clinical note: 58 -year-old male end-stage arthrosis left knee.  Failed conservative treatment.  Here for knee replacement.  Understood the risk of surgery including bleeding, infection, DVT, wound healing problems, stiffness, possible need for further surgery or manipulation.  Understood these risks and wished to proceed.  Procedure Note: Patient brought to operating room after regional anesthesia.  Timeout performed.   Antibiotics and 1 g TXA given IV.  Spinal anesthesia given.  Left leg was prepped and draped in typical  sterile fashion with a tourniquet on the thigh.  Leg was exsanguinated and thigh tourniquet was inflated to 325 mmHg.  Anterior approach to the knee was performed : incision made through skin down to extensor mechanism.  Full-thickness flap elevated off the retinaculum.  Medial parapatellar arthrotomy was performed.  Grade 4 changes were noted.    Provisional medial and lateral release was performed.  Provisional medial and lateral meniscus were excised.  Distal femoral PS bone cuts were performed.  Size 6 trial fit nicely.  Tibia was subluxed.  Tibial cut was performed and  size 6 trial fit nicely.  Was reduced with a 5 mm millimeter poly.  Knee came to full extension.  Good medial lateral stability.  Patella was cut freehand.  Sized for a 38 mm button.  Patella button tracked centrally with no touch technique.  Femoral rotation lugs were drilled.  Proximal tibia was drilled and keel cut.  Bone was pulse lavaged and dried.  Components were placed with cement.    Knee was in full extension while cement polymerized.  Wound was irrigated.  Arthrotomy repaired with interrupted suture.  Wound was closed in layers.  Dressed with soft compressive dressing.  Patient tolerated procedure well was taken recovery room in stable condition.        Complications:  None; patient tolerated the procedure well.    Disposition: PACU - hemodynamically stable.  Condition: stable         Additional Details: None    Attending Attestation: I was present and scrubbed for the entire procedure.    Valeriano Beatty  Phone Number: [phone]

## 2023-10-10 NOTE — ANESTHESIA POSTPROCEDURE EVALUATION
Patient: Chaitanya Elizabeth    Procedure Summary       Date: 10/10/23 Room / Location: [facility]    Anesthesia Start: 1124 Anesthesia Stop: 1411    Procedure: Left Knee Total Replacement (Left: Knee) Diagnosis:       Unilateral primary osteoarthritis, left knee      (Unilateral primary osteoarthritis, left knee [M17.12])    Surgeons: Valeriano Beatty MD Responsible Provider: Papa Ritter MD    Anesthesia Type: spinal, regional ASA Status: 2            Anesthesia Type: spinal, regional    Vitals Value Taken Time   /62 10/10/23 1430   Temp 36 °C (96.8 °F) 10/10/23 1407   Pulse 79 10/10/23 1430   Resp 15 10/10/23 1430   SpO2 94 % 10/10/23 1430       Anesthesia Post Evaluation    Patient location during evaluation: bedside  Patient participation: complete - patient participated  Level of consciousness: awake and alert  Pain management: satisfactory to patient  Airway patency: patent  Cardiovascular status: acceptable  Respiratory status: acceptable  Hydration status: acceptable        No notable events documented.

## 2023-10-10 NOTE — ANESTHESIA POSTPROCEDURE EVALUATION
Patient: Chaitanya Elizabeth    Procedure Summary       Date: 10/10/23 Room / Location: [facility] / [facility]    Anesthesia Start: 1124 Anesthesia Stop: 1411    Procedure: Left Knee Total Replacement (Left: Knee) Diagnosis:       Unilateral primary osteoarthritis, left knee      (Unilateral primary osteoarthritis, left knee [M17.12])    Surgeons: Valeriano Beatty MD Responsible Provider: Papa Ritter MD    Anesthesia Type: spinal, regional ASA Status: 2            Anesthesia Type: spinal, regional    Vitals Value Taken Time   /62 10/10/23 1430   Temp 36 °C (96.8 °F) 10/10/23 1407   Pulse 79 10/10/23 1430   Resp 15 10/10/23 1430   SpO2 94 % 10/10/23 1430       Anesthesia Post Evaluation    No notable events documented.

## 2023-10-10 NOTE — CARE PLAN
Problem: Mobility  Goal: STG - Patient will ambulate >200' with ww mod indep.  Outcome: Progressing  Goal: STG - Patient will ascend and descend four to six stairs with 1 HR mod indep.  Outcome: Progressing     Problem: Transfers  Goal: STG - Patient to transfer to and from sit to supine mod indep.  Outcome: Progressing  Goal: STG - Patient will transfer sit to and from stand mod indep.  Outcome: Progressing  Goal: STG - Patient will perform simulated car transfer mod indep.  Outcome: Progressing     Problem: Strength  Goal: Pt to complete 20 reps of TKR HEP indep.  Outcome: Progressing

## 2023-10-10 NOTE — PROGRESS NOTES
Physical Therapy    Physical Therapy Evaluation & Treatment    Patient Name: Chaitanya Elizabeth  MRN: [id number]  Today's Date: 10/10/2023   Time Calculation  Start Time: 1531  Stop Time: 1600  Time Calculation (min): 29 min    Assessment/Plan   PT Assessment  PT Assessment Results: Decreased range of motion, Impaired balance, Decreased mobility, Pain  Rehab Prognosis: Excellent  Barriers to Discharge: orthostatic hypotension  Evaluation/Treatment Tolerance: Treatment limited secondary to medical complications (Comment) (orthostatic hypotension)  Medical Staff Made Aware: Yes  Strengths: Rehab experience  End of Session Communication: Bedside nurse, Physician  End of Session Patient Position: Bed, 2 rail up  IP OR SWING BED PT PLAN  Inpatient or Swing Bed: Inpatient  PT Plan  Treatment/Interventions: Bed mobility, Transfer training, Gait training, Stair training, Balance training, Strengthening, Range of motion, Therapeutic exercise, Home exercise program  PT Plan: Skilled PT  PT Frequency: BID  PT Discharge Recommendations: Low intensity level of continued care  PT Recommended Transfer Status: Assist x1      Subjective     General Visit Information:  General  Reason for Referral: s/p L TKR  Past Medical History Relevant to Rehab: kidney stones, OA, R TKR (3/2023)  Patient Position Received: Bed, 2 rail up  Preferred Learning Style: verbal, written  General Comment: Pt pleasant and cooperative, mildly nauseous after standing. + orthostatic, returned to supine.  Home Living:  Home Living  Type of Home: House  Lives With: Spouse  Home Adaptive Equipment: Walker rolling or standard, Cane  Home Layout: Two level, Bed/bath upstairs, Stairs to alternate level with rails  Alternate Level Stairs-Rails: Both  Alternate Level Stairs-Number of Steps: 5  Home Access: Stairs to enter with rails  Entrance Stairs-Rails: Right  Entrance Stairs-Number of Steps: 1  Prior Level of Function:  Prior Function Per Pt/Caregiver Report  Level of  Lee: Independent with homemaking with ambulation  ADL Assistance: Independent  Homemaking Assistance: Independent  Ambulatory Assistance: Independent  Precautions:  Precautions  LE Weight Bearing Status: Weight Bearing as Tolerated  Medical Precautions: Fall precautions  Post-Surgical Precautions: Left total knee precautions  Vital Signs:  Vital Signs  BP: 127/79 (sitting IO=366/81; standing BP=95/61; supine BP after =101/61)  BP Location: Left arm  BP Method: Automatic  Patient Position: Lying    Objective   Pain:  Pain Assessment  Pain Assessment: 0-10  Pain Score: 6  Pain Type: Surgical pain  Pain Location: Knee  Pain Orientation: Left  Cognition:  Cognition  Overall Cognitive Status: Within Functional Limits  Orientation Level: Oriented X4    General Assessments:  General Observation  General Observation: Pt looking pale after returning back to bed after + orthostatic hypotension, placed in trendelenburg. Dr. Beatty at bedside, and aware. Plan is for pt to be admitted.     Activity Tolerance  Activity Tolerance Comments: Pt with + orthostatic hypotension upon standing, returned to bed for safety.    Static Sitting Balance  Static Sitting-Balance Support: Feet unsupported, Bilateral upper extremity supported  Static Sitting-Level of Assistance: Close supervision    Static Standing Balance  Static Standing-Balance Support: Bilateral upper extremity supported  Static Standing-Level of Assistance: Close supervision  Functional Assessments:  Bed Mobility  Bed Mobility: Yes  Bed Mobility 1  Bed Mobility 1: Supine to sitting, Sitting to supine  Level of Assistance 1: Contact guard  Bed Mobility Comments 1: Supine to Sit- Cueing to initially prop up onto elbows then come to long seated position with bilateral elbows fully extended.  Cueing for hand placement posterior to COG to advance hips to the EOB.    Sit to Supine- Pt was educated on return to supine with verbal cueing for reversal of OOB  technique.    Transfers  Transfer: Yes  Transfer 1  Technique 1: Sit to stand, Stand to sit  Transfer Device 1: Walker, Gait belt  Transfer Level of Assistance 1: Contact guard  Trials/Comments 1: Sit to Stand- Education on correct technique with sit to standing with emphasis on scooting towards the edge of the surface, bringing non-operative LE back while keeping the operative LE forward, and leaning slightly forward while coming up to a standing position.   Stand to Sit- Pt encouraged to feel for the surface of the bed on the posterior aspect of the legs prior to sit down, bring the operative leg forward while reaching back to the surface he/she is about to sit on for safety.    Ambulation/Gait Training  Ambulation/Gait Training Performed: No (+ orthostatic hypotension, not safe to amb away from bed.)  Extremity/Trunk Assessments:  RLE   RLE : Within Functional Limits  LLE   LLE : Exceptions to WFL  AROM LLE (degrees)  L Knee Flexion 0-130: 80  L Knee Extension 0-130: -10  Strength LLE  LLE Overall Strength: Greater than or equal to 3/5 as evidenced by functional mobility  Treatments:  Therapeutic Exercise  Therapeutic Exercise Performed: Yes  Therapeutic Exercise Activity 1: L AP, QS, GS, HS, SLR, SAQ, LAQ x 10 reps    See assessment section for treatment. Verbal instruction and modeling of mobility and safety techniques provided throughout evaluation and treatment. HEP and walking for exercise reviewed with pt.  Outcome Measures:  [facility] Basic Mobility  Turning from your back to your side while in a flat bed without using bedrails: A little  Moving from lying on your back to sitting on the side of a flat bed without using bedrails: A little  Moving to and from bed to chair (including a wheelchair): A little  Standing up from a chair using your arms (e.g. wheelchair or bedside chair): A little  To walk in hospital room: A little  Climbing 3-5 steps with railing: A little  Basic Mobility - Total Score:  18    Encounter Problems       Encounter Problems (Active)       Mobility       STG - Patient will ambulate >200' with ww mod indep. (Progressing)       Start:  10/10/23    Expected End:  10/24/23            STG - Patient will ascend and descend four to six stairs with 1 HR mod indep. (Progressing)       Start:  10/10/23    Expected End:  10/24/23               Strength       Pt to complete 20 reps of TKR HEP indep. (Progressing)       Start:  10/10/23    Expected End:  10/24/23               Transfers       STG - Patient to transfer to and from sit to supine mod indep. (Progressing)       Start:  10/10/23    Expected End:  10/24/23            STG - Patient will transfer sit to and from stand mod indep. (Progressing)       Start:  10/10/23    Expected End:  10/24/23            STG - Patient will perform simulated car transfer mod indep. (Progressing)       Start:  10/10/23    Expected End:  10/24/23                   Education Documentation  Handouts, taught by Mary Beth Ashley, PT at 10/10/2023  4:28 PM.  Learner: Significant Other, Patient  Readiness: Acceptance  Method: Explanation, Demonstration, Handout  Response: Verbalizes Understanding, Demonstrated Understanding    Precautions, taught by Mary Beth Ashley, PT at 10/10/2023  4:28 PM.  Learner: Significant Other, Patient  Readiness: Acceptance  Method: Explanation, Demonstration, Handout  Response: Verbalizes Understanding, Demonstrated Understanding    Body Mechanics, taught by Mary Beth Ashley, PT at 10/10/2023  4:28 PM.  Learner: Significant Other, Patient  Readiness: Acceptance  Method: Explanation, Demonstration, Handout  Response: Verbalizes Understanding, Demonstrated Understanding    Home Exercise Program, taught by Mary Beth Ashley, PT at 10/10/2023  4:28 PM.  Learner: Significant Other, Patient  Readiness: Acceptance  Method: Explanation, Demonstration, Handout  Response: Verbalizes Understanding, Demonstrated Understanding    Mobility Training, taught by  Mary Beth Ashley, PT at 10/10/2023  4:28 PM.  Learner: Significant Other, Patient  Readiness: Acceptance  Method: Explanation, Demonstration, Handout  Response: Verbalizes Understanding, Demonstrated Understanding    Education Comments  No comments found.

## 2023-10-10 NOTE — ANESTHESIA PROCEDURE NOTES
Spinal Block    Patient location during procedure: OR  Start time: 10/10/2023 11:28 AM  End time: 10/10/2023 11:32 AM  Reason for block: primary anesthetic and at surgeon's request  Staffing  Performed: MEY   Authorized by: Papa Ritter MD    Performed by: MEY Neville    Preanesthetic Checklist  Completed: patient identified, IV checked, risks and benefits discussed, surgical consent, monitors and equipment checked, pre-op evaluation, timeout performed and sterile techniques followed  Block Timeout  RN/Licensed healthcare professional reads aloud to the Anesthesia provider and entire team: Patient identity, procedure with side and site, patient position, and as applicable the availability of implants/special equipment/special requirements.  Patient on coagulant treatment: no  Timeout performed at:   Spinal Block  Patient position: sitting  Prep: Betadine  Sterility prep: cap, drape, gloves, gown, hand hygiene and mask  Sedation level: light sedation  Patient monitoring: blood pressure and continuous pulse oximetry  Approach: midline  Vertebral space: L3-4  Injection technique: single-shot  Needle  Needle type: Quincke   Needle gauge: 24 G  Free flowing CSF: yes    Assessment  Sensory level: T6 bilateral  Block outcome: patient comfortable  Procedure assessment: patient tolerated procedure well with no immediate complications  Additional Notes  Patient prepped and draped in sterile fashion. 3 ml of 1% lidocaine used to localize skin. L3-4 space confirmed with CSF. 4 ml of 1.5% mepivacaine injected into space. Bilateral T6 level. Patient tolerated procedure well.

## 2023-10-10 NOTE — ANESTHESIA PREPROCEDURE EVALUATION
Patient: Chaitanya Elizabeth    Procedure Information       Date/Time: 10/10/23 1020    Procedure: Left Knee Total Replacement (Left: Knee) - DePuy    Location: [facility] OR 06 / Virtual [facility] OR    Surgeons: Valeriano Beatty MD            Relevant Problems   Anesthesia (within normal limits)      Cardiovascular (within normal limits)      Endocrine   (+) Obesity      GI  Occas dyspepsia      /Renal   (+) Nephrolithiasis      Neuro/Psych (within normal limits)      Pulmonary (within normal limits)      Other   (+) Arthritis       Clinical information reviewed:    Allergies  Meds               NPO Detail:  NPO/Void Status  Carbonhydrate Drink Given Prior to Surgery? : N  Date of Last Liquid: 10/10/23  Time of Last Liquid: 0600  Date of Last Solid: 10/09/23  Time of Last Solid: 2200  Last Intake Type: Clear fluids  Time of Last Void: 0800         Physical Exam    Airway  Mallampati: II     Cardiovascular    Dental    Pulmonary    Abdominal            Anesthesia Plan    ASA 2     spinal and regional     intravenous induction   Anesthetic plan and risks discussed with patient.

## 2023-10-10 NOTE — PERIOPERATIVE NURSING NOTE
1407 - Pt to bay with anesthesia present, VSS,  on room air at this time,  plan of care reviewed, will continue to monitor    1412 - Family updated via text message    1430 - Report off to JOE Edwards.

## 2023-10-10 NOTE — PERIOPERATIVE NURSING NOTE
1445 pudding and ginger ale provided, polar cube applied.    1500 meds to beds arrived    1513 OR xray at bedside    PT at bedside, family called back to bay    1545 pt failed PT, pt became pale, lightheaded and dizzy while sitting at edge of bed with PT,     1554 zofran given iv push, pt placed back on monitors    1630, dinner tray ordered, pt off of monitor     Patient name & assigned room #  Chaitanya velez    Procedure: left knee replacement    Anesthesia type (i.e. general, regional, MAC): spinal, block    Nerve block (if applicable): left knee block    Estimated blood loss (EBL) in OR: 15    Relevant PMH: kidney stones, OA, PONV    Orientation/mental status: A&Ox4  Pt failed PT, lightheaded, dizzy, nauseous    Incision site(s)/location, surgical dressing(s), and drain type/location: liquband,meplilex,webril,ace,polar cube    Fluids given in OR/PACU, IV site(s), and drips/fluids currently infusinml in OR 100ml/hr in PACU    PO status (last oral intake): taking PO fluids, ordered dinner tray    Last set of vital signs & O2 requirements: RA, VSS    Current pain level & last pain/nausea medication dose/time given: 4/10 oxtcodone 5mg @ 1515    Next antibiotic due @ none ordered    Last tranexamic acid dose given @ 37548, second dose not ordered    Last void/Parrish in place:  last vid 450ml @1630    Equipment sent with patient (i.e. Polar Cube, TENs unit, walker, crutches): polar cube    SCDs on (yes/no): yes  Mode of transport (cart or bed): bed  Belongings sent with patient: yes  Emergency contact (name, relationship, phone number): marquis wife [phone]  PACU RN name and call back number:[email or web address]      [phone]  report sent to 7th floor RN    1800 pt medicated with Tylenol 650mgPO

## 2023-10-10 NOTE — H&P
[facility] Department of Orthopaedic Surgery   Surgical History & Physical <30 Days  10/10/2023    Reason for Surgery: L knee OA  Planned Procedure: L TKA    History & Physical Reviewed:  I have reviewed the History and Physical for obtained within the last 30 days. Relevant findings and updates are noted below:  No significant changes.    Home medications were reviewed with significant updates noted below:  No significant changes.    ERAS patient?: No    COVID-19 Risk Consent:   Surgeon has reviewed the key risks related to jacklyn COVID-19 and subsequent sequelae.     10/10/23 at 7:48 AM - Jacob De Santiago MD

## 2023-10-11 ENCOUNTER — HOME HEALTH ADMISSION (OUTPATIENT)
Dept: HOME HEALTH SERVICES | Facility: HOME HEALTH | Age: 58
End: 2023-10-11
Payer: COMMERCIAL

## 2023-10-11 VITALS
HEART RATE: 77 BPM | HEIGHT: 69 IN | OXYGEN SATURATION: 98 % | TEMPERATURE: 97.4 F | RESPIRATION RATE: 18 BRPM | WEIGHT: 249.34 LBS | DIASTOLIC BLOOD PRESSURE: 66 MMHG | SYSTOLIC BLOOD PRESSURE: 102 MMHG | BODY MASS INDEX: 36.93 KG/M2

## 2023-10-11 LAB
ERYTHROCYTE [DISTWIDTH] IN BLOOD BY AUTOMATED COUNT: 14 % (ref 11.5–14.5)
HCT VFR BLD AUTO: 38.1 % (ref 41–52)
HGB BLD-MCNC: 12.1 G/DL (ref 13.5–17.5)
MCH RBC QN AUTO: 28.1 PG (ref 26–34)
MCHC RBC AUTO-ENTMCNC: 31.8 G/DL (ref 32–36)
MCV RBC AUTO: 89 FL (ref 80–100)
NRBC BLD-RTO: 0 /100 WBCS (ref 0–0)
PLATELET # BLD AUTO: 222 X10*3/UL (ref 150–450)
PMV BLD AUTO: 10.4 FL (ref 7.5–11.5)
RBC # BLD AUTO: 4.3 X10*6/UL (ref 4.5–5.9)
WBC # BLD AUTO: 11 X10*3/UL (ref 4.4–11.3)

## 2023-10-11 PROCEDURE — 36415 COLL VENOUS BLD VENIPUNCTURE: CPT | Performed by: ORTHOPAEDIC SURGERY

## 2023-10-11 PROCEDURE — 2500000004 HC RX 250 GENERAL PHARMACY W/ HCPCS (ALT 636 FOR OP/ED): Performed by: NURSE PRACTITIONER

## 2023-10-11 PROCEDURE — 85027 COMPLETE CBC AUTOMATED: CPT | Performed by: ORTHOPAEDIC SURGERY

## 2023-10-11 PROCEDURE — 99238 HOSP IP/OBS DSCHRG MGMT 30/<: CPT | Performed by: NURSE PRACTITIONER

## 2023-10-11 PROCEDURE — S0119 ONDANSETRON 4 MG: HCPCS | Performed by: NURSE PRACTITIONER

## 2023-10-11 PROCEDURE — 7100000011 HC EXTENDED STAY RECOVERY HOURLY - NURSING UNIT

## 2023-10-11 PROCEDURE — 2500000005 HC RX 250 GENERAL PHARMACY W/O HCPCS: Performed by: NURSE PRACTITIONER

## 2023-10-11 PROCEDURE — 97116 GAIT TRAINING THERAPY: CPT | Mod: GP

## 2023-10-11 PROCEDURE — 2500000001 HC RX 250 WO HCPCS SELF ADMINISTERED DRUGS (ALT 637 FOR MEDICARE OP): Performed by: ORTHOPAEDIC SURGERY

## 2023-10-11 PROCEDURE — 97110 THERAPEUTIC EXERCISES: CPT | Mod: GP

## 2023-10-11 PROCEDURE — 2500000004 HC RX 250 GENERAL PHARMACY W/ HCPCS (ALT 636 FOR OP/ED): Performed by: ORTHOPAEDIC SURGERY

## 2023-10-11 RX ORDER — ONDANSETRON 4 MG/1
4 TABLET, ORALLY DISINTEGRATING ORAL EVERY 8 HOURS PRN
Status: DISCONTINUED | OUTPATIENT
Start: 2023-10-11 | End: 2023-10-11 | Stop reason: HOSPADM

## 2023-10-11 RX ADMIN — HYDROMORPHONE HYDROCHLORIDE 0.5 MG: 1 INJECTION, SOLUTION INTRAMUSCULAR; INTRAVENOUS; SUBCUTANEOUS at 03:16

## 2023-10-11 RX ADMIN — DOCUSATE SODIUM 100 MG: 100 CAPSULE, LIQUID FILLED ORAL at 08:34

## 2023-10-11 RX ADMIN — OXYCODONE HYDROCHLORIDE 10 MG: 5 TABLET ORAL at 10:46

## 2023-10-11 RX ADMIN — OXYCODONE HYDROCHLORIDE 10 MG: 5 TABLET ORAL at 06:10

## 2023-10-11 RX ADMIN — ASPIRIN 81 MG: 81 TABLET, COATED ORAL at 08:34

## 2023-10-11 RX ADMIN — OXYCODONE HYDROCHLORIDE 10 MG: 5 TABLET ORAL at 01:15

## 2023-10-11 RX ADMIN — POLYETHYLENE GLYCOL 3350 17 G: 17 POWDER, FOR SOLUTION ORAL at 08:35

## 2023-10-11 RX ADMIN — ONDANSETRON 4 MG: 4 TABLET, ORALLY DISINTEGRATING ORAL at 11:13

## 2023-10-11 RX ADMIN — ACETAMINOPHEN 650 MG: 325 TABLET ORAL at 12:00

## 2023-10-11 RX ADMIN — HYDROMORPHONE HYDROCHLORIDE 0.5 MG: 1 INJECTION, SOLUTION INTRAMUSCULAR; INTRAVENOUS; SUBCUTANEOUS at 08:37

## 2023-10-11 RX ADMIN — ACETAMINOPHEN 650 MG: 325 TABLET ORAL at 06:09

## 2023-10-11 RX ADMIN — ACETAMINOPHEN 650 MG: 325 TABLET ORAL at 00:29

## 2023-10-11 RX ADMIN — CEFAZOLIN SODIUM 2 G: 2 INJECTION, SOLUTION INTRAVENOUS at 04:17

## 2023-10-11 ASSESSMENT — PAIN - FUNCTIONAL ASSESSMENT: PAIN_FUNCTIONAL_ASSESSMENT: 0-10

## 2023-10-11 ASSESSMENT — PAIN SCALES - GENERAL
PAINLEVEL_OUTOF10: 7
PAINLEVEL_OUTOF10: 7
PAINLEVEL_OUTOF10: 3
PAINLEVEL_OUTOF10: 4
PAINLEVEL_OUTOF10: 7
PAINLEVEL_OUTOF10: 8

## 2023-10-11 ASSESSMENT — COGNITIVE AND FUNCTIONAL STATUS - GENERAL: MOBILITY SCORE: 24

## 2023-10-11 ASSESSMENT — ACTIVITIES OF DAILY LIVING (ADL): LACK_OF_TRANSPORTATION: NO

## 2023-10-11 NOTE — DISCHARGE INSTRUCTIONS
Diet: resume your regular diet    Activity: WBAT     No driving       Anticoagulation meds:  You will be discharged with a prescription for Aspirin 81 mg twice a day     Pawel Hose: Should be worn during the day for the next 4 weeks. Can remove at night.     Pain Meds: You will be sent home with a prescription for pain meds as well as a stool softener to help with constipation.  If you need a refill on your pain meds please contact Dr. Beatty's office. A 48 hour notice will need to be given for all pain medication refills.       Follow-up: Dr. Beatty's office in 2 weeks.  Office: [phone]    CALL PHYSICIAN:   Excessive nausea, vomiting, diarrhea greater than 24 hours.   Inability to urinate every 8-12 hours and bladder becomes too full and is painful.   Incision site: increased redness and or swelling; increased pain and or tenderness; progressive drainage or an unusual odor.  Call office if drainage continues beyond 5 days of surgery.   Excessive Bleeding: Slow general oozing that completely soaks dressing or fresh bright red bleeding or bleeding that will not stop.    Operative leg: has a change in color, numbness, tingling, and coldness to the touch or excessive pain.

## 2023-10-11 NOTE — PROGRESS NOTES
10/11/23 0933   Discharge Planning   Living Arrangements Spouse/significant other   Support Systems Spouse/significant other   Assistance Needed [facility]   Type of Residence Private residence   Number of Stairs to Enter Residence 1   Number of Stairs Within Residence 3   Do you have animals or pets at home? Yes   Home or Post Acute Services In home services   Type of Home Care Services Home PT;Home OT   Patient expects to be discharged to: [facility]   Does the patient need discharge transport arranged? No     I met with patient at bedside and explained tcc role. He lives in a house with his wife, was ambulating independently prior to surgery and driving. Plans to go home with [facility] on dc. POD 1 R knee. Message sent to [facility] RN to confirm processing for SOC.

## 2023-10-11 NOTE — CARE PLAN
Problem: Mobility  Goal: STG - Patient will ambulate >200' with ww mod indep.  Outcome: Met  Goal: STG - Patient will ascend and descend four to six stairs with 1 HR mod indep.  Outcome: Met     Problem: Transfers  Goal: STG - Patient to transfer to and from sit to supine mod indep.  Outcome: Met  Goal: STG - Patient will transfer sit to and from stand mod indep.  Outcome: Met  Goal: STG - Patient will perform simulated car transfer mod indep.  Outcome: Met     Problem: Strength  Goal: Pt to complete 20 reps of TKR HEP indep.  Outcome: Progressing

## 2023-10-11 NOTE — CARE PLAN
Problem: Fall/Injury  Goal: Be free from injury by end of the shift  Outcome: Progressing   The patient's goals for the shift include ambulation    The clinical goals for the shift include no injury during shift    Over the shift, the patient did not make progress toward the following goals. Barriers to progression include pain med sedation. Recommendations to address these barriers include monitoring for s/e.

## 2023-10-11 NOTE — PROGRESS NOTES
Physical Therapy    Physical Therapy Treatment    Patient Name: Chaitanya Elizabeth  MRN: [id number]  Today's Date: 10/11/2023  Time Calculation  Start Time: 0953  Stop Time: 1016  Time Calculation (min): 23 min       Assessment/Plan   PT Assessment  PT Assessment Results: Decreased range of motion, Impaired balance, Decreased mobility, Pain  Rehab Prognosis: Excellent  Barriers to Discharge: orthostatic hypotension  Evaluation/Treatment Tolerance: Treatment limited secondary to medical complications (Comment) (orthostatic hypotension)  Medical Staff Made Aware: Yes  Strengths: Rehab experience  End of Session Communication: Bedside nurse (via Epic messaging)  End of Session Patient Position: Up in chair, Alarm off, not on at start of session  PT Plan  Inpatient/Swing Bed or Outpatient: Inpatient  PT Plan  Treatment/Interventions: Bed mobility, Transfer training, Gait training, Stair training, Balance training, Strengthening, Range of motion, Therapeutic exercise, Home exercise program  PT Plan: Skilled PT  PT Frequency: Other (Comment) (Pt has met criteria to be d/c'd from PT standpoint.)  PT Discharge Recommendations: Low intensity level of continued care  PT Recommended Transfer Status: Assist x1 (due to IV pole)  PT - OK to Discharge: Yes      General Visit Information:   PT  Visit  PT Received On: 10/11/23  Response to Previous Treatment: Compliant with home exercise program  General  Reason for Referral: s/p L TKR  Past Medical History Relevant to Rehab: kidney stones, OA, R TKR (3/2023)  Prior to Session Communication: Bedside nurse  Patient Position Received: Bed, 2 rail up  Preferred Learning Style: verbal  General Comment: Pt reports feeling better today, has been amb in the room and to bathroom without any dizziness or lightheadedness.    Subjective   Precautions:  Precautions  LE Weight Bearing Status: Weight Bearing as Tolerated  Medical Precautions: Fall precautions  Post-Surgical Precautions: Left total knee  precautions    Objective   Pain:  Pain Assessment  Pain Assessment: 0-10  Pain Score: 3  Pain Type: Surgical pain  Pain Location: Knee  Pain Orientation: Left  Cognition:  Cognition  Overall Cognitive Status: Within Functional Limits  Orientation Level: Oriented X4    Activity Tolerance:  Activity Tolerance  Endurance: Endurance does not limit participation in activity  Treatments:  Therapeutic Exercise  Therapeutic Exercise Performed: Yes  Therapeutic Exercise Activity 1: L AP, LAQ, seated knee flexion x 10 reps (Overdue breakfast tray arrived.)    Bed Mobility  Bed Mobility: Yes  Bed Mobility 1  Bed Mobility 1: Supine to sitting, Sitting to supine  Level of Assistance 1: Independent    Ambulation/Gait Training  Ambulation/Gait Training Performed: Yes  Ambulation/Gait Training 1  Surface 1: Level tile  Device 1: Rolling walker  Gait Support Devices: Gait belt  Assistance 1: Independent  Quality of Gait 1: Inconsistent stride length (No LOB, step-through gait pattern, no knee buckling.)  Comments/Distance (ft) 1: 15' x 2, 75' x 2  Transfers  Transfer: Yes  Transfer 1  Technique 1: Sit to stand, Stand to sit  Transfer Device 1: Walker  Transfer Level of Assistance 1: Independent  Trials/Comments 1: Good carryover regarding proper technique from previous tx.    Stairs  Stairs: Yes  Stairs  Rails 1: Bilateral  Support Devices 1: Gait belt  Assistance 1: Independent  Comment/Number of Steps 1: 3    Outcome Measures:  [facility] Basic Mobility  Turning from your back to your side while in a flat bed without using bedrails: None  Moving from lying on your back to sitting on the side of a flat bed without using bedrails: None  Moving to and from bed to chair (including a wheelchair): None  Standing up from a chair using your arms (e.g. wheelchair or bedside chair): None  To walk in hospital room: None  Climbing 3-5 steps with railing: None  Basic Mobility - Total Score: 24    Education Documentation  Handouts, taught by Mary Beth BLACKMAN  Dion, PT at 10/11/2023 10:37 AM.  Learner: Patient  Readiness: Acceptance  Method: Explanation  Response: Verbalizes Understanding, Demonstrated Understanding    Precautions, taught by Mary Beth Ashley, PT at 10/11/2023 10:37 AM.  Learner: Patient  Readiness: Acceptance  Method: Explanation  Response: Verbalizes Understanding, Demonstrated Understanding    Body Mechanics, taught by Mary Beth Ashley, PT at 10/11/2023 10:37 AM.  Learner: Patient  Readiness: Acceptance  Method: Explanation  Response: Verbalizes Understanding, Demonstrated Understanding    Home Exercise Program, taught by Mary Beth Ashley, PT at 10/11/2023 10:37 AM.  Learner: Patient  Readiness: Acceptance  Method: Explanation  Response: Verbalizes Understanding, Demonstrated Understanding    Mobility Training, taught by Mary Beth Ashley, PT at 10/11/2023 10:37 AM.  Learner: Patient  Readiness: Acceptance  Method: Explanation  Response: Verbalizes Understanding, Demonstrated Understanding    Handouts, taught by Mary Beth Ashley, PT at 10/10/2023  4:28 PM.  Learner: Significant Other, Patient  Readiness: Acceptance  Method: Explanation, Demonstration, Handout  Response: Verbalizes Understanding, Demonstrated Understanding    Precautions, taught by Mary Beth Ashley, PT at 10/10/2023  4:28 PM.  Learner: Significant Other, Patient  Readiness: Acceptance  Method: Explanation, Demonstration, Handout  Response: Verbalizes Understanding, Demonstrated Understanding    Body Mechanics, taught by Mary Beth Ashley, PT at 10/10/2023  4:28 PM.  Learner: Significant Other, Patient  Readiness: Acceptance  Method: Explanation, Demonstration, Handout  Response: Verbalizes Understanding, Demonstrated Understanding    Home Exercise Program, taught by Mary Beth Ashley, PT at 10/10/2023  4:28 PM.  Learner: Significant Other, Patient  Readiness: Acceptance  Method: Explanation, Demonstration, Handout  Response: Verbalizes Understanding, Demonstrated Understanding    Mobility Training,  taught by Mary Beth Ashley, PT at 10/10/2023  4:28 PM.  Learner: Significant Other, Patient  Readiness: Acceptance  Method: Explanation, Demonstration, Handout  Response: Verbalizes Understanding, Demonstrated Understanding    Education Comments  No comments found.        OP EDUCATION:  Education  Individual(s) Educated: Patient  Education Provided: POC  Patient/Caregiver Demonstrated Understanding: yes  Plan of Care Discussed and Agreed Upon: yes  Patient Response to Education: Patient/Caregiver Verbalized Understanding of Information, Patient/Caregiver Performed Return Demonstration of Exercises/Activities    Encounter Problems       Encounter Problems (Active)       Strength       Pt to complete 20 reps of TKR HEP indep. (Progressing)       Start:  10/10/23    Expected End:  10/24/23                  Encounter Problems (Resolved)       Mobility       STG - Patient will ambulate >200' with ww mod indep. (Met)       Start:  10/10/23    Expected End:  10/24/23    Resolved:  10/11/23         STG - Patient will ascend and descend four to six stairs with 1 HR mod indep. (Met)       Start:  10/10/23    Expected End:  10/24/23    Resolved:  10/11/23            Transfers       STG - Patient to transfer to and from sit to supine mod indep. (Met)       Start:  10/10/23    Expected End:  10/24/23    Resolved:  10/11/23         STG - Patient will transfer sit to and from stand mod indep. (Met)       Start:  10/10/23    Expected End:  10/24/23    Resolved:  10/11/23         STG - Patient will perform simulated car transfer mod indep. (Met)       Start:  10/10/23    Expected End:  10/24/23    Resolved:  10/11/23

## 2023-10-11 NOTE — NURSING NOTE

## 2023-10-11 NOTE — DISCHARGE SUMMARY
Discharge Diagnosis  Arthritis    Issues Requiring Follow-Up  S/p Left TKA    Test Results Pending At Discharge  Pending Labs       No current pending labs.            Hospital Course   Briefly, the patient is a 58 year-old male with past Hx of osteoarthritis of left knee.  Pt is now S/P left TKA      HOSPITAL COURSE: The patient underwent left total knee arthroplasty on 10/10/23 which patient tolerated well and remained stable in the postoperative period. On postoperative day #1, patient was tolerating a diet, and remained afebrile with stable vital signs. Patient was ordered oral and intravenous narcotics for pain control.  Patient was judged stable for discharge home on 10/11/23,tolerating diet, afebrile, stable vital signs and lab values, with adequate pain control. The wound was clean and dry. Patient was instructed to follow up in the office within 2 weeks. Pt given prescription for pain, aspirin 81 mg BID for DVT prophylaxis and compression stockings. Colace as needed for constipation. Home PT/ OT was arranged prior to discharge.      Pertinent Physical Exam At Time of Discharge  Physical Exam  Constitutional:       Appearance: Normal appearance.   HENT:      Head: Normocephalic.      Nose: Nose normal.      Mouth/Throat:      Mouth: Mucous membranes are moist.      Pharynx: Oropharynx is clear.   Eyes:      Pupils: Pupils are equal, round, and reactive to light.   Cardiovascular:      Pulses: Normal pulses.   Pulmonary:      Effort: Pulmonary effort is normal.   Abdominal:      General: Abdomen is flat.      Palpations: Abdomen is soft.   Musculoskeletal:      Comments: Left Lower Extremity:   -Skin intact with mepilex  -Tender at site of injury with painful ROM.  -Fires DF/PF/EHL/FHL  -SILT in saph/sural/SPN/DPN distributions  -Foot warm, well perfused  -Palpable DP pulse, brisk cap refill  -Compartments soft and compressible     Neurological:      Mental Status: He is alert.         Home Medications      Medication List      START taking these medications     aspirin 81 mg chewable tablet; Chew 1 tablet (81 mg) 2 times a day for   14 days.   docusate sodium 100 mg capsule; Commonly known as: Colace; Take 1   capsule (100 mg) by mouth 2 times a day for 14 days.   meloxicam 7.5 mg tablet; Commonly known as: Mobic; Take 1 tablet (7.5   mg) by mouth once daily for 14 days.   oxyCODONE 5 mg immediate release tablet; Commonly known as: Roxicodone;   Take 1 tablet (5 mg) by mouth every 6 hours.   traMADol 50 mg tablet; Commonly known as: Ultram; Take 2 tablets (100   mg) by mouth every 6 hours if needed for severe pain (7 - 10) for up to 14   days.     CONTINUE taking these medications     loratadine 10 mg tablet; Commonly known as: Claritin   melatonin 10 mg tablet   Multivitamin 50 Plus; Generic drug: multivitamin with minerals iron-free     STOP taking these medications     ibuprofen 200 mg tablet       Outpatient Follow-Up  No future appointments.    Sarah Velarde, RICARDO-CNP

## 2023-10-12 ENCOUNTER — HOME CARE VISIT (OUTPATIENT)
Dept: HOME HEALTH SERVICES | Facility: HOME HEALTH | Age: 58
End: 2023-10-12
Payer: COMMERCIAL

## 2023-10-12 VITALS
WEIGHT: 250 LBS | SYSTOLIC BLOOD PRESSURE: 128 MMHG | DIASTOLIC BLOOD PRESSURE: 80 MMHG | HEART RATE: 81 BPM | TEMPERATURE: 98.7 F | BODY MASS INDEX: 37.03 KG/M2 | RESPIRATION RATE: 14 BRPM | HEIGHT: 69 IN

## 2023-10-12 PROCEDURE — G0151 HHCP-SERV OF PT,EA 15 MIN: HCPCS

## 2023-10-12 PROCEDURE — 0023 HH SOC

## 2023-10-12 SDOH — HEALTH STABILITY: PHYSICAL HEALTH: EXERCISE ACTIVITIES SETS: 1

## 2023-10-12 SDOH — HEALTH STABILITY: PHYSICAL HEALTH: EXERCISE TYPE: TKA

## 2023-10-12 SDOH — HEALTH STABILITY: PHYSICAL HEALTH: EXERCISE ACTIVITY: QUAD SETS

## 2023-10-12 SDOH — HEALTH STABILITY: PHYSICAL HEALTH: PHYSICAL EXERCISE: SUPINE

## 2023-10-12 SDOH — HEALTH STABILITY: PHYSICAL HEALTH: EXERCISE ACTIVITY: SEATED FLEXION

## 2023-10-12 SDOH — HEALTH STABILITY: PHYSICAL HEALTH: EXERCISE ACTIVITIES SETS: 2

## 2023-10-12 SDOH — HEALTH STABILITY: PHYSICAL HEALTH: EXERCISE ACTIVITY: HEEL SLIDES

## 2023-10-12 SDOH — HEALTH STABILITY: PHYSICAL HEALTH: EXERCISE COMMENTS: INSTRUCTED TO PROGRESS TO LAQS AS TOLERATED

## 2023-10-12 SDOH — HEALTH STABILITY: PHYSICAL HEALTH: EXERCISE ACTIVITY: ANKLE PUMPS

## 2023-10-12 SDOH — HEALTH STABILITY: PHYSICAL HEALTH: EXERCISE ACTIVITY: GLUTE SETS

## 2023-10-12 SDOH — HEALTH STABILITY: PHYSICAL HEALTH: PHYSICAL EXERCISE: 20

## 2023-10-12 SDOH — HEALTH STABILITY: PHYSICAL HEALTH: PHYSICAL EXERCISE: 10

## 2023-10-12 SDOH — HEALTH STABILITY: PHYSICAL HEALTH: PHYSICAL EXERCISE: SEATED

## 2023-10-12 SDOH — HEALTH STABILITY: PHYSICAL HEALTH: PHYSICAL EXERCISE: 5

## 2023-10-12 SDOH — HEALTH STABILITY: PHYSICAL HEALTH: EXERCISE ACTIVITY: SAQ

## 2023-10-12 ASSESSMENT — ENCOUNTER SYMPTOMS
SUBJECTIVE PAIN PROGRESSION: GRADUALLY IMPROVING
PAIN LOCATION - PAIN SEVERITY: 2/10
PAIN SEVERITY GOAL: 0/10
PAIN LOCATION: LEFT KNEE
HIGHEST PAIN SEVERITY IN PAST 24 HOURS: 7/10
LIMITED RANGE OF MOTION: 1
LOWEST PAIN SEVERITY IN PAST 24 HOURS: 2/10
PAIN LOCATION - EXACERBATING FACTORS: MOVEMENT
PERSON REPORTING PAIN: PATIENT
MUSCLE WEAKNESS: 1
PAIN: 1
NAUSEA: 1

## 2023-10-12 ASSESSMENT — ACTIVITIES OF DAILY LIVING (ADL)
AMBULATION ASSISTANCE: 1
OASIS_M1830: 02
AMBULATION_DISTANCE/DURATION_TOLERATED: 50 FEET
AMBULATION ASSISTANCE ON FLAT SURFACES: 1
CURRENT_FUNCTION: STAND BY ASSIST
AMBULATION ASSISTANCE: STAND BY ASSIST
ENTERING_EXITING_HOME: ONE PERSON
PHYSICAL TRANSFERS ASSESSED: 1

## 2023-10-13 ENCOUNTER — HOME CARE VISIT (OUTPATIENT)
Dept: HOME HEALTH SERVICES | Facility: HOME HEALTH | Age: 58
End: 2023-10-13
Payer: COMMERCIAL

## 2023-10-17 ENCOUNTER — HOME CARE VISIT (OUTPATIENT)
Dept: HOME HEALTH SERVICES | Facility: HOME HEALTH | Age: 58
End: 2023-10-17
Payer: COMMERCIAL

## 2023-10-17 DIAGNOSIS — M17.10 ARTHRITIS OF KNEE: ICD-10-CM

## 2023-10-17 PROCEDURE — G0159 HHC PT MAINT EA 15 MIN: HCPCS

## 2023-10-17 PROCEDURE — G0151 HHCP-SERV OF PT,EA 15 MIN: HCPCS

## 2023-10-17 SDOH — HEALTH STABILITY: PHYSICAL HEALTH: EXERCISE TYPE: TOTAL KNEE ARTHROPLASTY

## 2023-10-17 ASSESSMENT — ENCOUNTER SYMPTOMS
LOWEST PAIN SEVERITY IN PAST 24 HOURS: 1/10
HIGHEST PAIN SEVERITY IN PAST 24 HOURS: 6/10
PAIN LOCATION: LEFT KNEE
SUBJECTIVE PAIN PROGRESSION: UNCHANGED
PAIN LOCATION - PAIN SEVERITY: 4/10
PAIN: 1
PERSON REPORTING PAIN: PATIENT

## 2023-10-18 RX ORDER — OXYCODONE HYDROCHLORIDE 5 MG/1
5 TABLET ORAL EVERY 6 HOURS
Qty: 28 TABLET | Refills: 0 | Status: SHIPPED | OUTPATIENT
Start: 2023-10-18 | End: 2023-10-25

## 2023-10-19 ENCOUNTER — HOME CARE VISIT (OUTPATIENT)
Dept: HOME HEALTH SERVICES | Facility: HOME HEALTH | Age: 58
End: 2023-10-19
Payer: COMMERCIAL

## 2023-10-19 PROCEDURE — G0151 HHCP-SERV OF PT,EA 15 MIN: HCPCS

## 2023-10-19 SDOH — HEALTH STABILITY: PHYSICAL HEALTH: PHYSICAL EXERCISE: SIT AND STANDING AT STEP

## 2023-10-19 SDOH — HEALTH STABILITY: PHYSICAL HEALTH: PHYSICAL EXERCISE: SUPINE

## 2023-10-19 SDOH — HEALTH STABILITY: PHYSICAL HEALTH: PHYSICAL EXERCISE: STAND

## 2023-10-19 SDOH — HEALTH STABILITY: PHYSICAL HEALTH: EXERCISE ACTIVITY: SLR

## 2023-10-19 SDOH — HEALTH STABILITY: PHYSICAL HEALTH: EXERCISE TYPE: TKA

## 2023-10-19 SDOH — HEALTH STABILITY: PHYSICAL HEALTH: EXERCISE ACTIVITY: POSTURAL MM TRAINING

## 2023-10-19 SDOH — HEALTH STABILITY: PHYSICAL HEALTH: PHYSICAL EXERCISE: SIT AND SUPINE

## 2023-10-19 SDOH — HEALTH STABILITY: PHYSICAL HEALTH: EXERCISE ACTIVITY: ACTIVE KNEE FLEXION

## 2023-10-19 SDOH — HEALTH STABILITY: PHYSICAL HEALTH: EXERCISE ACTIVITY: LATERAL AND TANDEM WT SHIFTING

## 2023-10-19 SDOH — HEALTH STABILITY: PHYSICAL HEALTH: EXERCISE ACTIVITY: PRE GT DRILL

## 2023-10-19 SDOH — HEALTH STABILITY: PHYSICAL HEALTH: EXERCISE ACTIVITY: FLEXION STRETCH

## 2023-10-19 SDOH — HEALTH STABILITY: PHYSICAL HEALTH: PHYSICAL EXERCISE: SIT

## 2023-10-19 SDOH — HEALTH STABILITY: PHYSICAL HEALTH: EXERCISE ACTIVITY: PASSIVE PROLONGED KNEE EXT STRETCH

## 2023-10-19 SDOH — HEALTH STABILITY: PHYSICAL HEALTH: EXERCISE COMMENTS: PT WITH GOOD RECALL OF HEP, REQ OCC CUES FOR FORM AND PACE.

## 2023-10-19 SDOH — HEALTH STABILITY: PHYSICAL HEALTH: EXERCISE ACTIVITY: SUPINE SAQ

## 2023-10-19 SDOH — HEALTH STABILITY: PHYSICAL HEALTH: EXERCISE ACTIVITY: LAQ

## 2023-10-19 SDOH — HEALTH STABILITY: PHYSICAL HEALTH: EXERCISE ACTIVITY: SUPINE QUAD SETS

## 2023-10-20 ENCOUNTER — TELEPHONE (OUTPATIENT)
Dept: ORTHOPEDIC SURGERY | Facility: CLINIC | Age: 58
End: 2023-10-20
Payer: COMMERCIAL

## 2023-10-20 DIAGNOSIS — M17.10 ARTHRITIS OF KNEE: ICD-10-CM

## 2023-10-20 RX ORDER — TRAMADOL HYDROCHLORIDE 50 MG/1
100 TABLET ORAL EVERY 6 HOURS PRN
Qty: 28 TABLET | Refills: 0 | Status: SHIPPED | OUTPATIENT
Start: 2023-10-20 | End: 2023-11-03

## 2023-10-24 ENCOUNTER — HOME CARE VISIT (OUTPATIENT)
Dept: HOME HEALTH SERVICES | Facility: HOME HEALTH | Age: 58
End: 2023-10-24
Payer: COMMERCIAL

## 2023-10-24 PROBLEM — L50.3 DERMATOGRAPHISM: Status: ACTIVE | Noted: 2023-02-07

## 2023-10-24 PROBLEM — J30.9 ALLERGIC RHINITIS: Status: ACTIVE | Noted: 2022-06-22

## 2023-10-24 PROBLEM — Z79.891 LONG TERM (CURRENT) USE OF OPIATE ANALGESIC: Status: ACTIVE | Noted: 2023-03-30

## 2023-10-24 PROBLEM — E78.2 MIXED HYPERLIPIDEMIA: Status: ACTIVE | Noted: 2019-10-30

## 2023-10-24 PROBLEM — K21.9 GASTROESOPHAGEAL REFLUX DISEASE: Status: ACTIVE | Noted: 2021-12-21

## 2023-10-24 PROBLEM — Z97.3 WEARS GLASSES: Status: ACTIVE | Noted: 2019-10-29

## 2023-10-24 PROBLEM — M25.469 EFFUSION OF JOINT, LOWER LEG: Status: ACTIVE | Noted: 2023-10-24

## 2023-10-24 PROBLEM — M17.11 OSTEOARTHROSIS, LOCALIZED, PRIMARY, KNEE, RIGHT: Status: ACTIVE | Noted: 2019-10-29

## 2023-10-24 PROBLEM — Z96.651 PRESENCE OF RIGHT ARTIFICIAL KNEE JOINT: Status: ACTIVE | Noted: 2023-03-30

## 2023-10-24 PROBLEM — R39.14 FEELING OF INCOMPLETE BLADDER EMPTYING: Status: ACTIVE | Noted: 2019-01-09

## 2023-10-24 PROBLEM — R73.03 PREDIABETES: Status: ACTIVE | Noted: 2019-10-30

## 2023-10-24 PROCEDURE — G0151 HHCP-SERV OF PT,EA 15 MIN: HCPCS

## 2023-10-24 RX ORDER — ONDANSETRON 4 MG/1
4 TABLET, FILM COATED ORAL EVERY 6 HOURS PRN
COMMUNITY

## 2023-10-24 RX ORDER — AZITHROMYCIN 500 MG/1
500 TABLET, FILM COATED ORAL
COMMUNITY

## 2023-10-24 RX ORDER — MELOXICAM 15 MG/1
15 TABLET ORAL DAILY
COMMUNITY

## 2023-10-24 RX ORDER — IBUPROFEN 200 MG
200 TABLET ORAL 3 TIMES DAILY PRN
COMMUNITY
End: 2023-11-29

## 2023-10-24 SDOH — HEALTH STABILITY: PHYSICAL HEALTH: PHYSICAL EXERCISE: SIT

## 2023-10-24 SDOH — HEALTH STABILITY: PHYSICAL HEALTH: EXERCISE ACTIVITY: KNEE EXT STRETCH

## 2023-10-24 SDOH — HEALTH STABILITY: PHYSICAL HEALTH: EXERCISE ACTIVITY: ALT MARCH

## 2023-10-24 SDOH — HEALTH STABILITY: PHYSICAL HEALTH: EXERCISE ACTIVITY: SLR

## 2023-10-24 SDOH — HEALTH STABILITY: PHYSICAL HEALTH: EXERCISE ACTIVITY: LAQ

## 2023-10-24 SDOH — HEALTH STABILITY: PHYSICAL HEALTH: PHYSICAL EXERCISE: STAND

## 2023-10-24 SDOH — HEALTH STABILITY: PHYSICAL HEALTH: PHYSICAL EXERCISE: SUPINE

## 2023-10-24 SDOH — HEALTH STABILITY: PHYSICAL HEALTH: EXERCISE TYPE: TKA

## 2023-10-24 SDOH — HEALTH STABILITY: PHYSICAL HEALTH: PHYSICAL EXERCISE: SIT AND STAND

## 2023-10-24 SDOH — HEALTH STABILITY: PHYSICAL HEALTH: EXERCISE ACTIVITY: KNEE FLEX STRETCH

## 2023-10-24 SDOH — HEALTH STABILITY: PHYSICAL HEALTH: PHYSICAL EXERCISE: STAND AND SUPINE

## 2023-10-24 SDOH — HEALTH STABILITY: PHYSICAL HEALTH: PHYSICAL EXERCISE: STAND AND SIT

## 2023-10-24 SDOH — HEALTH STABILITY: PHYSICAL HEALTH: EXERCISE ACTIVITY: SAQ

## 2023-10-24 SDOH — HEALTH STABILITY: PHYSICAL HEALTH: PHYSICAL EXERCISE: SUPINE AND STAND

## 2023-10-24 SDOH — HEALTH STABILITY: PHYSICAL HEALTH: PHYSICAL EXERCISE: SUPINED

## 2023-10-24 SDOH — HEALTH STABILITY: PHYSICAL HEALTH: EXERCISE ACTIVITY: HAMSTRING CURL

## 2023-10-24 SDOH — HEALTH STABILITY: PHYSICAL HEALTH: EXERCISE ACTIVITY: QUAD SETS

## 2023-10-24 ASSESSMENT — ACTIVITIES OF DAILY LIVING (ADL)
AMBULATION_DISTANCE/DURATION_TOLERATED: 200FT
AMBULATION ASSISTANCE ON FLAT SURFACES: 1

## 2023-10-24 ASSESSMENT — ENCOUNTER SYMPTOMS
PAIN SEVERITY GOAL: 0/10
PAIN LOCATION - RELIEVING FACTORS: REST
PERSON REPORTING PAIN: PATIENT
LOWEST PAIN SEVERITY IN PAST 24 HOURS: 0/10
PAIN LOCATION: LEFT KNEE
PAIN: 1
HIGHEST PAIN SEVERITY IN PAST 24 HOURS: 7/10

## 2023-10-25 ENCOUNTER — OFFICE VISIT (OUTPATIENT)
Dept: ORTHOPEDIC SURGERY | Facility: CLINIC | Age: 58
End: 2023-10-25
Payer: COMMERCIAL

## 2023-10-25 ENCOUNTER — APPOINTMENT (OUTPATIENT)
Dept: ORTHOPEDIC SURGERY | Facility: CLINIC | Age: 58
End: 2023-10-25
Payer: COMMERCIAL

## 2023-10-25 ENCOUNTER — ANCILLARY PROCEDURE (OUTPATIENT)
Dept: RADIOLOGY | Facility: CLINIC | Age: 58
End: 2023-10-25
Payer: COMMERCIAL

## 2023-10-25 VITALS — BODY MASS INDEX: 37.03 KG/M2 | HEIGHT: 69 IN | WEIGHT: 250 LBS

## 2023-10-25 DIAGNOSIS — M17.10 ARTHRITIS OF KNEE: ICD-10-CM

## 2023-10-25 DIAGNOSIS — M17.10 ARTHRITIS OF KNEE: Primary | ICD-10-CM

## 2023-10-25 PROCEDURE — 73562 X-RAY EXAM OF KNEE 3: CPT | Mod: LEFT SIDE | Performed by: RADIOLOGY

## 2023-10-25 PROCEDURE — 73562 X-RAY EXAM OF KNEE 3: CPT | Mod: LT,FY

## 2023-10-25 PROCEDURE — 1036F TOBACCO NON-USER: CPT | Performed by: ORTHOPAEDIC SURGERY

## 2023-10-25 PROCEDURE — 99024 POSTOP FOLLOW-UP VISIT: CPT | Performed by: ORTHOPAEDIC SURGERY

## 2023-10-25 RX ORDER — OXYCODONE HYDROCHLORIDE 5 MG/1
5 TABLET ORAL EVERY 6 HOURS PRN
Qty: 15 TABLET | Refills: 0 | Status: SHIPPED | OUTPATIENT
Start: 2023-10-25 | End: 2023-11-01

## 2023-10-25 NOTE — PROGRESS NOTES
S: Pain well controlled.  Some painless clicking in the right knee.  Wants to return to work Thanksgiving.  Doing home therapy.  Outpatient therapy in 2 weeks    O: Incisions healing nicely. Good alignment.  Normal swelling.  Full range of motion    XR: I personally reviewed the following radiographic exams: Left knee shows well fixed cemented total knee.    A: S/P left total knee    P: Follow-up in 3 weeks prior to return to work.  Refill pain meds.

## 2023-10-25 NOTE — PATIENT INSTRUCTIONS
S: Pain well controlled.  Some painless clicking in the right knee.  Working home therapy through next week and then outpatient.  Once returned to work week of Thanksgiving    O: Incisions healing nicely. Good alignment.    XR: I personally reviewed the following radiographic exams: Left knee shows well aligned cemented total knee.    A: S/P left total knee    P: We will refill oxycodone.  Follow-up in 3 weeks to evaluate for return to work.

## 2023-10-26 ENCOUNTER — HOME CARE VISIT (OUTPATIENT)
Dept: HOME HEALTH SERVICES | Facility: HOME HEALTH | Age: 58
End: 2023-10-26
Payer: COMMERCIAL

## 2023-10-26 PROCEDURE — G2168 SVS BY PT IN HOME HEALTH: HCPCS

## 2023-10-26 PROCEDURE — G0157 HHC PT ASSISTANT EA 15: HCPCS

## 2023-10-26 SDOH — HEALTH STABILITY: PHYSICAL HEALTH: PHYSICAL EXERCISE: SIT

## 2023-10-26 SDOH — HEALTH STABILITY: PHYSICAL HEALTH: EXERCISE ACTIVITY: HAMSTRING SETS

## 2023-10-26 SDOH — HEALTH STABILITY: PHYSICAL HEALTH: PHYSICAL EXERCISE: SUPINE, STAND

## 2023-10-26 SDOH — HEALTH STABILITY: PHYSICAL HEALTH: EXERCISE ACTIVITY: SLR

## 2023-10-26 SDOH — HEALTH STABILITY: PHYSICAL HEALTH: EXERCISE ACTIVITY: QUAD SETS

## 2023-10-26 SDOH — HEALTH STABILITY: PHYSICAL HEALTH: PHYSICAL EXERCISE: SUPINE

## 2023-10-26 SDOH — HEALTH STABILITY: PHYSICAL HEALTH: EXERCISE ACTIVITY: CALF RAISES WITH QUAD SETS

## 2023-10-26 SDOH — HEALTH STABILITY: PHYSICAL HEALTH: EXERCISE ACTIVITY: HAMSTRING CURLS

## 2023-10-26 SDOH — HEALTH STABILITY: PHYSICAL HEALTH: PHYSICAL EXERCISE: STAND

## 2023-10-26 SDOH — HEALTH STABILITY: PHYSICAL HEALTH: EXERCISE ACTIVITY: SAQ

## 2023-10-26 SDOH — HEALTH STABILITY: PHYSICAL HEALTH: PHYSICAL EXERCISE: SUPINE AND SIT

## 2023-10-26 SDOH — HEALTH STABILITY: PHYSICAL HEALTH: EXERCISE ACTIVITY: LAQ

## 2023-10-26 SDOH — HEALTH STABILITY: PHYSICAL HEALTH: EXERCISE ACTIVITY: ALTERNATING MARCH

## 2023-10-26 SDOH — HEALTH STABILITY: PHYSICAL HEALTH: EXERCISE TYPE: TKA PROGRAM

## 2023-10-26 SDOH — HEALTH STABILITY: PHYSICAL HEALTH: EXERCISE ACTIVITY: MINI SQUATS

## 2023-10-26 ASSESSMENT — ENCOUNTER SYMPTOMS
PAIN: 1
PAIN LOCATION: LEFT KNEE
LOWEST PAIN SEVERITY IN PAST 24 HOURS: 0/10
HIGHEST PAIN SEVERITY IN PAST 24 HOURS: 4/10
PAIN SEVERITY GOAL: 0/10
PERSON REPORTING PAIN: PATIENT
SUBJECTIVE PAIN PROGRESSION: GRADUALLY IMPROVING

## 2023-10-26 ASSESSMENT — ACTIVITIES OF DAILY LIVING (ADL)
AMBULATION ASSISTANCE ON FLAT SURFACES: 1
AMBULATION_DISTANCE/DURATION_TOLERATED: 300
AMBULATION ASSISTANCE ON UNEVEN SURFACES: 1

## 2023-10-30 ENCOUNTER — APPOINTMENT (OUTPATIENT)
Dept: HOME HEALTH SERVICES | Facility: HOME HEALTH | Age: 58
End: 2023-10-30
Payer: COMMERCIAL

## 2023-10-31 ENCOUNTER — HOME CARE VISIT (OUTPATIENT)
Dept: HOME HEALTH SERVICES | Facility: HOME HEALTH | Age: 58
End: 2023-10-31
Payer: COMMERCIAL

## 2023-10-31 PROCEDURE — G0157 HHC PT ASSISTANT EA 15: HCPCS

## 2023-10-31 SDOH — HEALTH STABILITY: PHYSICAL HEALTH: EXERCISE TYPE: L TKA PROGRAM

## 2023-10-31 SDOH — HEALTH STABILITY: PHYSICAL HEALTH: EXERCISE COMMENTS: TKA PROGRAM IN SEATED, SUPINE AND STANDING POSITION.  PT INDEP

## 2023-10-31 ASSESSMENT — ACTIVITIES OF DAILY LIVING (ADL)
AMBULATION_DISTANCE/DURATION_TOLERATED: 300 FT
AMBULATION ASSISTANCE ON FLAT SURFACES: 1
AMBULATION ASSISTANCE ON UNEVEN SURFACES: 1

## 2023-10-31 ASSESSMENT — ENCOUNTER SYMPTOMS
LOWEST PAIN SEVERITY IN PAST 24 HOURS: 0/10
HIGHEST PAIN SEVERITY IN PAST 24 HOURS: 7/10
PAIN: 1
PAIN SEVERITY GOAL: 0/10
PERSON REPORTING PAIN: PATIENT

## 2023-11-02 ENCOUNTER — HOME CARE VISIT (OUTPATIENT)
Dept: HOME HEALTH SERVICES | Facility: HOME HEALTH | Age: 58
End: 2023-11-02
Payer: COMMERCIAL

## 2023-11-02 VITALS
DIASTOLIC BLOOD PRESSURE: 80 MMHG | SYSTOLIC BLOOD PRESSURE: 134 MMHG | RESPIRATION RATE: 14 BRPM | TEMPERATURE: 97.5 F | HEART RATE: 75 BPM

## 2023-11-02 PROCEDURE — G0151 HHCP-SERV OF PT,EA 15 MIN: HCPCS

## 2023-11-02 ASSESSMENT — ACTIVITIES OF DAILY LIVING (ADL)
OASIS_M1830: 00
HOME_HEALTH_OASIS: 00

## 2023-11-02 ASSESSMENT — ENCOUNTER SYMPTOMS
HIGHEST PAIN SEVERITY IN PAST 24 HOURS: 5/10
DENIES PAIN: 1
SUBJECTIVE PAIN PROGRESSION: GRADUALLY IMPROVING
PERSON REPORTING PAIN: PATIENT

## 2023-11-07 DIAGNOSIS — Z96.652 STATUS POST LEFT KNEE REPLACEMENT: Primary | ICD-10-CM

## 2023-11-08 ENCOUNTER — EVALUATION (OUTPATIENT)
Dept: PHYSICAL THERAPY | Facility: CLINIC | Age: 58
End: 2023-11-08
Payer: COMMERCIAL

## 2023-11-08 DIAGNOSIS — M25.562 ACUTE PAIN OF LEFT KNEE: Primary | ICD-10-CM

## 2023-11-08 PROCEDURE — 97110 THERAPEUTIC EXERCISES: CPT | Mod: GP | Performed by: PHYSICAL THERAPIST

## 2023-11-08 PROCEDURE — 97161 PT EVAL LOW COMPLEX 20 MIN: CPT | Mod: GP | Performed by: PHYSICAL THERAPIST

## 2023-11-08 ASSESSMENT — ENCOUNTER SYMPTOMS
DEPRESSION: 0
LOSS OF SENSATION IN FEET: 0
OCCASIONAL FEELINGS OF UNSTEADINESS: 0

## 2023-11-08 ASSESSMENT — PAIN - FUNCTIONAL ASSESSMENT: PAIN_FUNCTIONAL_ASSESSMENT: 0-10

## 2023-11-08 ASSESSMENT — PAIN SCALES - GENERAL: PAINLEVEL_OUTOF10: 5 - MODERATE PAIN

## 2023-11-08 NOTE — LETTER
November 8, 2023     Patient: Chaitanya Elizabeth   YOB: 1965   Date of Visit: 11/8/2023       To Whom It May Concern:    It is my medical opinion that Chaitanya Elizabeth {Work release (duty restriction):27704}.    If you have any questions or concerns, please don't hesitate to call.         Sincerely,        Maribel Chavarria, PT    CC: No Recipients

## 2023-11-08 NOTE — LETTER
November 8, 2023     Patient: Chaitanya Elizabeth   YOB: 1965   Date of Visit: 11/8/2023       To Whom it May Concern:    Chaitanya Elizabeth was seen in my clinic on 11/8/2023. He {Return to school/sport:87214}.    If you have any questions or concerns, please don't hesitate to call.         Sincerely,          Maribel Chavarria, PT        CC: No Recipients

## 2023-11-08 NOTE — PROGRESS NOTES
Physical Therapy Evaluation and Treatment      Patient Name: Chaitanya Elizabeth  MRN: [id number]  Today's Date: 11/8/2023  Time Calculation  Start Time: 0900  Stop Time: 0935  Time Calculation (min): 35 min      Insurance:  Visit:  1 of 12  [facility] required: No  Provider: [facility]      Assessment:  PT Assessment Results: Decreased strength, Decreased range of motion, Decreased mobility, Pain  Rehab Prognosis: Good  Evaluation/Treatment Tolerance: Patient tolerated treatment well  Patient presents to physical therapy with c/o L knee stiffness, weakness, and discomfort s/p LTKA.  Exhibits decreased L knee ROM, decreased strength, impaired gait, and increased swelling consistent with referring diagnosis.      Plan:  Treatment/Interventions: Cryotherapy, Education/ Instruction, Electrical stimulation, Gait training, Hot pack, Manual therapy, Neuromuscular re-education, Self care/ home management, Therapeutic activities, Therapeutic exercises, Vasopneumatic device  PT Plan: Skilled PT  PT Frequency: 2 times per week  Duration: 6-8 wks  Onset Date: 10/10/23  Rehab Potential: Good  Plan of Care Agreement: Patient    Current Problem:   1. Acute pain of left knee  Follow Up In Physical Therapy          Subjective    Patient presents to physical therapy for evaluation of L knee.  Hx of B knee with RTKA in March 2023.  Underwent LTKA 10/10/23 by Dr. Beatty.  C/o L knee pain, stiffness, and weakness.    PREVIOUS INTERVENTION:  Injections    EXACERBATING FACTORS:  Prolonged walking/standing  Prolonged sitting  Restless sleep  Step to on stairs    RELIEVING FACTORS:  Tylenol, Tramadol  Ice    OCCUPATION:   - temporarily of work (works from home, plans to return 11/20/23)    HOBBIES:  Fishing  Yard work  Gym; ride bike    HOME SETUP:  Two story, lives with wife    BARRIERS IMPACTING CARE:  N/A    General:  General  Reason for Referral: S/p LTKA  Referred By: Dr. Beatty  Precautions:  Precautions  STEADI Fall Risk Score  (The score of 4 or more indicates an increased risk of falling): 0  Medical Precautions: No known precautions/limitation (Medical hx reviewed)  Pain:  Pain Assessment  Pain Assessment: 0-10  Pain Score: 5 - Moderate pain  Pain Location: Knee  Prior Level of Function:  Prior Function Per Pt/Caregiver Report  Level of Bay: Independent with ADLs and functional transfers    Objective   4wks post op (Tues)    OBSERVATION  Gait WNL without AD    AROM  R knee flexion 125  R knee extension 0    L knee flexion 114  L knee extension -3    STRENGTH  R knee flexion 5/5  R knee extension 5/5    L knee flexion 4-/5  L knee extension 4-/5    R hip WNL    L hip flexion 4/5  L hip extension 3+/5  L hip ABD 4-/5  L hip ER 4-/5    Outcome Measures:  Other Measures  Lower Extremity Funtional Score (LEFS): 45/80     TREATMENT  THERAPEUTIC EXERCISE:  Access Code: [email or web address]  URL: [email or web address]  Date: 11/08/2023  Prepared by: Maribel Chavarria    Exercises  - Supine Straight Leg Raises  - 2 x daily - 7 x weekly - 1 sets - 10 reps  - Supine Bridge  - 2 x daily - 7 x weekly - 1 sets - 10 reps  - Clamshell  - 2 x daily - 7 x weekly - 1 sets - 10 reps  - Sidelying Hip Abduction  - 2 x daily - 7 x weekly - 1 sets - 10 reps  - Seated Knee Extension Stretch with Chair  - 2 x daily - 7 x weekly - 1 sets - 2 reps - 5min hold  - Single Leg Balance with Clock Reach  - 2 x daily - 7 x weekly - 1 sets - 10 reps      OP EDUCATION:   Patient education on diagnosis/prognosis, pathophysiology, POC, and HEP.  Patient demonstrates understanding of HEP/POC.    Goals:  1. Pain 0/10  2. Outcomes Measure:  LEFS 65/80  3. L knee ROM   4. LLE strength   5. Demonstrates independence with HEP.

## 2023-11-10 ENCOUNTER — TREATMENT (OUTPATIENT)
Dept: PHYSICAL THERAPY | Facility: CLINIC | Age: 58
End: 2023-11-10
Payer: COMMERCIAL

## 2023-11-10 DIAGNOSIS — M25.562 ACUTE PAIN OF LEFT KNEE: Primary | ICD-10-CM

## 2023-11-10 PROCEDURE — 97140 MANUAL THERAPY 1/> REGIONS: CPT | Mod: GP | Performed by: PHYSICAL THERAPIST

## 2023-11-10 PROCEDURE — 97016 VASOPNEUMATIC DEVICE THERAPY: CPT | Mod: GP | Performed by: PHYSICAL THERAPIST

## 2023-11-10 PROCEDURE — 97110 THERAPEUTIC EXERCISES: CPT | Mod: GP | Performed by: PHYSICAL THERAPIST

## 2023-11-10 ASSESSMENT — PAIN - FUNCTIONAL ASSESSMENT: PAIN_FUNCTIONAL_ASSESSMENT: 0-10

## 2023-11-10 ASSESSMENT — PAIN SCALES - GENERAL: PAINLEVEL_OUTOF10: 3

## 2023-11-10 NOTE — PROGRESS NOTES
Physical Therapy Treatment    Patient Name: Chaitanya Elizabeth  MRN: [id number]  Today's Date: 11/10/2023  Time Calculation  Start Time: 0917  Stop Time: 1020  Time Calculation (min): 63 min    Insurance:  Visit:  2 of 12  Auth required: No  Provider: [facility]    Assessment:   Demonstrates increased ROM at start of session.  Able to progress balance exercises without increased sx.    Plan:   Progress strengthening as tolerated.  Initiate step ups and tap downs.    Current Problem  1. Acute pain of left knee            Subjective   Feeling a little stiff and sore today.    General  Reason for Referral: S/p LTKA  Referred By: Dr. Beatty  Precautions  Precautions  Medical Precautions: No known precautions/limitation (Medical hx reviewed)  Pain  Pain Assessment: 0-10  Pain Score: 3  Pain Location: Knee    Objective   L knee ROM 0-118  4wks post op (Tues)    TREATMENT  THERAPEUTIC EXERCISE:  Recumbent bike seat 12 random level 1 for 6mins  Bridges 10x  SLR 10x  SL ER 10x  SL ABD 10x  SLS with 3-way tap 10x ea  Fwd step and hold on foam 10x  Lateral step and hold on foam 10x  TG L4 + 40# B press 10x  TG L3 SDR 6 SL press 10xea    MANUAL THERAPY:  Scar tissue massage  Patellar glides all planes gr III  Fem AP gr III  Tib AP/PA gr III    NEUROMUSCULAR RE-EDUCATION:      THERAPEUTIC ACTIVITY:      MODALITIES:  VND L knee 15'      OP EDUCATION:   Add fwd step and hold and home TG to HEP.    Goals:  1. Pain 0/10  2. Outcomes Measure:  LEFS 65/80  3. L knee ROM   4. LLE strength   5. Demonstrates independence with HEP.

## 2023-11-12 ENCOUNTER — HOSPITAL ENCOUNTER (EMERGENCY)
Facility: HOSPITAL | Age: 58
Discharge: HOME | End: 2023-11-12
Attending: EMERGENCY MEDICINE
Payer: COMMERCIAL

## 2023-11-12 VITALS
HEART RATE: 62 BPM | DIASTOLIC BLOOD PRESSURE: 76 MMHG | HEIGHT: 69 IN | OXYGEN SATURATION: 96 % | TEMPERATURE: 97.5 F | BODY MASS INDEX: 36.88 KG/M2 | RESPIRATION RATE: 15 BRPM | SYSTOLIC BLOOD PRESSURE: 138 MMHG | WEIGHT: 249 LBS

## 2023-11-12 DIAGNOSIS — N48.89 SWELLING OF PENIS: Primary | ICD-10-CM

## 2023-11-12 PROCEDURE — 99281 EMR DPT VST MAYX REQ PHY/QHP: CPT | Mod: 25

## 2023-11-12 PROCEDURE — 94760 N-INVAS EAR/PLS OXIMETRY 1: CPT

## 2023-11-12 PROCEDURE — 99283 EMERGENCY DEPT VISIT LOW MDM: CPT | Performed by: EMERGENCY MEDICINE

## 2023-11-12 PROCEDURE — 99285 EMERGENCY DEPT VISIT HI MDM: CPT | Mod: 25 | Performed by: EMERGENCY MEDICINE

## 2023-11-12 ASSESSMENT — PAIN SCALES - GENERAL: PAINLEVEL_OUTOF10: 0 - NO PAIN

## 2023-11-12 ASSESSMENT — COLUMBIA-SUICIDE SEVERITY RATING SCALE - C-SSRS
2. HAVE YOU ACTUALLY HAD ANY THOUGHTS OF KILLING YOURSELF?: NO
6. HAVE YOU EVER DONE ANYTHING, STARTED TO DO ANYTHING, OR PREPARED TO DO ANYTHING TO END YOUR LIFE?: NO
1. IN THE PAST MONTH, HAVE YOU WISHED YOU WERE DEAD OR WISHED YOU COULD GO TO SLEEP AND NOT WAKE UP?: NO

## 2023-11-12 ASSESSMENT — LIFESTYLE VARIABLES
REASON UNABLE TO ASSESS: NO
HAVE PEOPLE ANNOYED YOU BY CRITICIZING YOUR DRINKING: NO
EVER HAD A DRINK FIRST THING IN THE MORNING TO STEADY YOUR NERVES TO GET RID OF A HANGOVER: NO
EVER FELT BAD OR GUILTY ABOUT YOUR DRINKING: NO
HAVE YOU EVER FELT YOU SHOULD CUT DOWN ON YOUR DRINKING: NO

## 2023-11-12 ASSESSMENT — PAIN - FUNCTIONAL ASSESSMENT: PAIN_FUNCTIONAL_ASSESSMENT: 0-10

## 2023-11-12 NOTE — DISCHARGE INSTRUCTIONS
Seek immediate medical attention if you develop swelling of the skin of the penis that does not go away.  Seek immediate medical attention if your skin develops: redness, swelling, warmth to touch, discharge or drainage, increasing pain, or any new or worsening symptoms.  Seek immediate medical attention if you develop:  difficulty urinating, discharge from the penis, fever, nausea, vomiting, abdominal pain, or any new or worsening symptoms.

## 2023-11-12 NOTE — ED PROVIDER NOTES
HPI   Chief Complaint   Patient presents with    swollen penis     Pt noticed penile swelling around 0700 today. Denies pain.        Patient is a 58-year-old male with history of prediabetes and GERD presenting with painless penile swelling for the past 3 hours.  Patient states that he was masturbating this morning and noticed the swelling afterward, located to the shaft of the penis.  This is never happened before.  He denies dysuria, frequency, urgency, hematuria, exudate, or ankle emptying.  He denies concern for STI.  He states that the swelling appears to have improved, but he wanted to get it checked out.    PMH: Prediabetes, GERD  Past social history: Never smoker, infrequent alcohol use, no illicit drug use                        No data recorded                Patient History   Past Medical History:   Diagnosis Date    Other specified health status 06/12/2019    No pertinent past medical history     Past Surgical History:   Procedure Laterality Date    OTHER SURGICAL HISTORY  06/12/2019    Knee surgery    OTHER SURGICAL HISTORY  06/12/2019    Meniscus repair     Family History   Problem Relation Name Age of Onset    No Known Problems Mother      No Known Problems Father       Social History     Tobacco Use    Smoking status: Never    Smokeless tobacco: Never   Substance Use Topics    Alcohol use: Yes     Alcohol/week: 1.0 standard drink of alcohol     Types: 1 Cans of beer per week    Drug use: Not on file       Physical Exam   ED Triage Vitals [11/12/23 0852]   Temp Heart Rate Resp BP   36.4 °C (97.5 °F) 62 15 138/76      SpO2 Temp Source Heart Rate Source Patient Position   96 % Tympanic Monitor Sitting      BP Location FiO2 (%)     Right arm --       Physical Exam  Vitals and nursing note reviewed. Exam conducted with a chaperone present.   Constitutional:       General: He is not in acute distress.     Appearance: Normal appearance. He is not toxic-appearing.   HENT:      Head: Normocephalic and  atraumatic.      Right Ear: External ear normal.      Left Ear: External ear normal.      Nose: Nose normal.      Mouth/Throat:      Mouth: Mucous membranes are moist.      Pharynx: No oropharyngeal exudate or posterior oropharyngeal erythema.   Eyes:      General: No scleral icterus.     Extraocular Movements: Extraocular movements intact.      Pupils: Pupils are equal, round, and reactive to light.   Cardiovascular:      Rate and Rhythm: Normal rate and regular rhythm.      Pulses: Normal pulses.      Heart sounds: Normal heart sounds. No murmur heard.     No friction rub. No gallop.   Pulmonary:      Effort: Pulmonary effort is normal. No respiratory distress.      Breath sounds: Normal breath sounds. No stridor. No wheezing, rhonchi or rales.   Abdominal:      General: Abdomen is flat. Bowel sounds are normal. There is no distension.      Palpations: Abdomen is soft. There is no mass.      Tenderness: There is no abdominal tenderness. There is no right CVA tenderness, left CVA tenderness, guarding or rebound.      Hernia: No hernia is present.   Genitourinary:     Penis: Circumcised. No phimosis, paraphimosis, erythema, tenderness, discharge or swelling.       Testes: Normal. Cremasteric reflex is present.         Right: Tenderness or swelling not present.         Left: Tenderness or swelling not present.      Epididymis:      Right: Normal.      Left: Normal.   Musculoskeletal:         General: No swelling, deformity or signs of injury. Normal range of motion.      Cervical back: Normal range of motion and neck supple. No rigidity.      Right lower leg: No edema.      Left lower leg: No edema.   Lymphadenopathy:      Cervical: No cervical adenopathy.   Skin:     General: Skin is warm and dry.      Capillary Refill: Capillary refill takes less than 2 seconds.      Findings: No rash.   Neurological:      General: No focal deficit present.      Mental Status: He is alert and oriented to person, place, and time.  Mental status is at baseline.      Cranial Nerves: No cranial nerve deficit.      Sensory: No sensory deficit.      Motor: No weakness.      Coordination: Coordination normal.   Psychiatric:         Mood and Affect: Mood normal.         Behavior: Behavior normal.       ED Course & MDM   Diagnoses as of 11/12/23 1031   Swelling of penis       Medical Decision Making  Patient is a 58-year-old male presenting with penile swelling today.  In the ED, vital signs are stable and within normal limits.  Physical exam, patient has a normal-appearing circumcised penis.  There is no exudate or tenderness.  No testicular swelling or tenderness.  No evidence of phimosis or paraphimosis.  Patient is appropriate for discharge.  All questions were answered, return precautions discussed.  Patient will follow-up with his PCP.  He was also given a referral to urology.    Discussed with attending physician Dr. Arenas.      DO Sven Hagen DO  Resident  11/12/23 0471

## 2023-11-15 ENCOUNTER — OFFICE VISIT (OUTPATIENT)
Dept: ORTHOPEDIC SURGERY | Facility: CLINIC | Age: 58
End: 2023-11-15
Payer: COMMERCIAL

## 2023-11-15 ENCOUNTER — TREATMENT (OUTPATIENT)
Dept: PHYSICAL THERAPY | Facility: CLINIC | Age: 58
End: 2023-11-15
Payer: COMMERCIAL

## 2023-11-15 VITALS — BODY MASS INDEX: 36.88 KG/M2 | WEIGHT: 249 LBS | HEIGHT: 69 IN

## 2023-11-15 DIAGNOSIS — M25.562 ACUTE PAIN OF LEFT KNEE: Primary | ICD-10-CM

## 2023-11-15 DIAGNOSIS — M17.10 ARTHRITIS OF KNEE: Primary | ICD-10-CM

## 2023-11-15 PROCEDURE — 99024 POSTOP FOLLOW-UP VISIT: CPT | Performed by: ORTHOPAEDIC SURGERY

## 2023-11-15 PROCEDURE — 1036F TOBACCO NON-USER: CPT | Performed by: ORTHOPAEDIC SURGERY

## 2023-11-15 PROCEDURE — 97140 MANUAL THERAPY 1/> REGIONS: CPT | Mod: GP | Performed by: PHYSICAL THERAPIST

## 2023-11-15 PROCEDURE — 97110 THERAPEUTIC EXERCISES: CPT | Mod: GP | Performed by: PHYSICAL THERAPIST

## 2023-11-15 ASSESSMENT — PAIN SCALES - GENERAL: PAINLEVEL_OUTOF10: 4

## 2023-11-15 ASSESSMENT — PAIN - FUNCTIONAL ASSESSMENT
PAIN_FUNCTIONAL_ASSESSMENT: 0-10
PAIN_FUNCTIONAL_ASSESSMENT: NO/DENIES PAIN

## 2023-11-15 NOTE — PROGRESS NOTES
"Physical Therapy Treatment    Patient Name: Chaitanya Elizabeth  MRN: [id number]  Today's Date: 11/15/2023  Time Calculation  Start Time: 0850  Stop Time: 0930  Time Calculation (min): 40 min    Insurance:  Visit:  3 of 12  Auth required: No  Provider: [facility]    Assessment:   Demonstrates increased ROM at start of session.  Reports fatigue with exercises.    Plan:   Progress strengthening as tolerated.  Initiate side step with band.    Current Problem  1. Acute pain of left knee              Subjective   Feeling increased soreness today.  Has not worked on his total gym at home yet.    General  Reason for Referral: S/p LTKA  Referred By: Dr. Beatty  Precautions  Precautions  Medical Precautions: No known precautions/limitation (Medical hx reviewed)  Pain  Pain Assessment: 0-10  Pain Score: 4  Pain Location: Knee    Objective   L knee ROM 0-124  5wks post op (Tues)    TREATMENT  THERAPEUTIC EXERCISE:  Recumbent bike seat 12 random level 2 for 6mins  Bridges 10x  SLR 10x  SL ER 10x  SL ABD 10x  TG L4 + 40# B press 10x2  TG L3 SDR 6 SL press 10xea  6\" step up 10x2  4\" lateral tap down 10x2  Mini squats at bar 10x2    MANUAL THERAPY:  Patellar glides all planes gr III  Fem AP gr III  Tib AP/PA gr III    NEUROMUSCULAR RE-EDUCATION:      THERAPEUTIC ACTIVITY:      MODALITIES:      OP EDUCATION:   N/A    Goals:  1. Pain 0/10  2. Outcomes Measure:  LEFS 65/80  3. L knee ROM   4. LLE strength   5. Demonstrates independence with HEP.  "

## 2023-11-15 NOTE — LETTER
November 15, 2023     Patient: Chaitanya Elizabeth   YOB: 1965   Date of Visit: 11/15/2023       To Whom It May Concern:    It is my medical opinion that Chaitanya Elizabeth may return to work on 11/20/23 full duty no restrictions .    If you have any questions or concerns, please don't hesitate to call.         Sincerely,        Valeriano Beatty MD

## 2023-11-15 NOTE — PROGRESS NOTES
Returns for left knee.  Still working with therapy.  Wants to return to work on Monday.  Taking Tylenol for pain    Exam: Incision well-healed.  Near full extension.  Flexes to 120.  Soft endpoint.    Assessment: Status post left total knee.    Plan: Continue therapy exercises for strength and endurance.  No restrictions.  Follow-up x-ray of both knees in March.

## 2023-11-17 ENCOUNTER — TREATMENT (OUTPATIENT)
Dept: PHYSICAL THERAPY | Facility: CLINIC | Age: 58
End: 2023-11-17
Payer: COMMERCIAL

## 2023-11-17 DIAGNOSIS — M25.562 ACUTE PAIN OF LEFT KNEE: Primary | ICD-10-CM

## 2023-11-17 PROCEDURE — 97016 VASOPNEUMATIC DEVICE THERAPY: CPT | Mod: GP | Performed by: PHYSICAL THERAPIST

## 2023-11-17 PROCEDURE — 97110 THERAPEUTIC EXERCISES: CPT | Mod: GP | Performed by: PHYSICAL THERAPIST

## 2023-11-17 PROCEDURE — 97140 MANUAL THERAPY 1/> REGIONS: CPT | Mod: GP | Performed by: PHYSICAL THERAPIST

## 2023-11-17 ASSESSMENT — PAIN - FUNCTIONAL ASSESSMENT: PAIN_FUNCTIONAL_ASSESSMENT: 0-10

## 2023-11-17 ASSESSMENT — PAIN SCALES - GENERAL: PAINLEVEL_OUTOF10: 2

## 2023-11-17 NOTE — PROGRESS NOTES
Physical Therapy Treatment    Patient Name: Cahitanya Elizabeth  MRN: [id number]  Today's Date: 11/17/2023  Time Calculation  Start Time: 0917  Stop Time: 1015  Time Calculation (min): 58 min      Insurance:  Visit:  4 of 12  Auth required: No  Provider: [facility]    Assessment:   Able to progress dynamic balance without increased sx.    Plan:   Progress strengthening as tolerated.      Current Problem  1. Acute pain of left knee            Subjective   A little achy today due to weather.    General  Reason for Referral: S/p LTKA  Referred By: Dr. Beatty  Precautions  Precautions  Medical Precautions: No known precautions/limitation (Medical hx reviewed)  Pain  Pain Assessment: 0-10  Pain Score: 2  Pain Location: Knee    Objective   L knee ROM 0-125  5wks post op (Tues)    TREATMENT  THERAPEUTIC EXERCISE:  Recumbent bike seat 12 random level 2 for 6mins  Bridges 10x  SL ER 10x  SL ABD 10x  Fwd step and hold on foam 10x  Lateral step and hold on foam 10x   SLS with 3-way tap on foam 10x  GTB side stepping 3 laps  TG L4 + 40# B press 10x2  TG L3 SDR 6 SL press 10xea    MANUAL THERAPY:  Patellar glides all planes gr III  Fem AP gr III  Tib AP/PA gr III    NEUROMUSCULAR RE-EDUCATION:      THERAPEUTIC ACTIVITY:      MODALITIES:  VND 15' L knee    OP EDUCATION:   N/A    Goals:  1. Pain 0/10  2. Outcomes Measure:  LEFS 65/80  3. L knee ROM   4. LLE strength   5. Demonstrates independence with HEP.

## 2023-11-20 ENCOUNTER — TREATMENT (OUTPATIENT)
Dept: PHYSICAL THERAPY | Facility: CLINIC | Age: 58
End: 2023-11-20
Payer: COMMERCIAL

## 2023-11-20 DIAGNOSIS — M25.562 ACUTE PAIN OF LEFT KNEE: Primary | ICD-10-CM

## 2023-11-20 PROCEDURE — 97110 THERAPEUTIC EXERCISES: CPT | Mod: GP,CQ

## 2023-11-20 ASSESSMENT — PAIN - FUNCTIONAL ASSESSMENT: PAIN_FUNCTIONAL_ASSESSMENT: 0-10

## 2023-11-20 ASSESSMENT — PAIN SCALES - GENERAL: PAINLEVEL_OUTOF10: 1

## 2023-11-20 ASSESSMENT — PAIN DESCRIPTION - DESCRIPTORS: DESCRIPTORS: ACHING

## 2023-11-20 NOTE — PROGRESS NOTES
"Physical Therapy    Physical Therapy Treatment    Patient Name: Chaitanya Elizabeth  MRN: [id number]  Today's Date: 11/20/2023  Time Calculation  Start Time: 0853  Stop Time: 0933  Time Calculation (min): 40 min     Insurance:  Visit:  5 of 12  Auth required: No  Provider: [facility]    Assessment:   Pt continues with activities this visit to increase strength, stability of L knee & improve overall functional mobility.  He demonstrates good knowledge of the current activities & had good follow through to vc's when provided.  He was able to progress with additional TB activities, adding monster walk, W walk, B TB stdg hip PRE's & TB march to the activities performed for further strengthening.  Pt was appropriately challenged with the progressions made.  Overall tolerated session well & reports normal muscle soreness, fatigue at end of session.  Pt declined need for modality post activity.    Plan:  Continue to progress with POC as tolerated to increase strength, stability of L knee & improve overall functional mobility, capacity for daily activities.     Current Problem  1. Acute pain of left knee          General  Reason for Referral: S/p LTKA  Referred By: Dr. Beatty    Subjective    General   \"This weekend, it was kind of sore on Sunday, but I did a lot of walking on Saturday.\"  Pt report he was shopping & went to [employer].  Still has a little soreness this morning.  Overall, he reports he is improving.  He reports he is able to use reciprocal pattern on steps when ascending.  Still has some difficulty descending stairs & not always able yet to use reciprocal pattern.  Precautions  Precautions  Medical Precautions: No known precautions/limitation  Precautions Comment: No recent falls reported    Pain  Pain Assessment  Pain Assessment: 0-10  Pain Score: 1  Pain Location: Knee  Pain Orientation: Left  Pain Descriptors: Aching  Pain Frequency: Intermittent  Clinical Progression: Gradually improving  Effect of Pain on Daily " Activities:  (Walking, Stairs, Standing>1/2 hour, Sleep)    Objective   No assistive device used for ambulation.    Treatments:  THERAPEUTIC EXERCISE (59754):  38 Minutes,  3 Units    Activities:  Recumbent bike seat 12 random level 2 for 6mins  Bridges 10x  SL ER 10x  SL ABD 10x  Fwd step and hold on foam 10x  Lateral step and hold on foam 10x   SLS with 3-way tap on foam 10x  GTB side stepping 3 laps  GTB Monster Walk 2 laps  GTB W Walk 2 laps  Stdg Hip PRE's (3 way): GTB, x10 each MICKEY  TG L4 + 40# B press 10x2 (not this visit) --> resume NV  TG L3 SDR 6 SL press 10xea (not this visit) --> resume NV

## 2023-11-29 ENCOUNTER — TREATMENT (OUTPATIENT)
Dept: PHYSICAL THERAPY | Facility: CLINIC | Age: 58
End: 2023-11-29
Payer: COMMERCIAL

## 2023-11-29 DIAGNOSIS — M25.562 ACUTE PAIN OF LEFT KNEE: Primary | ICD-10-CM

## 2023-11-29 DIAGNOSIS — Z96.651 PRESENCE OF RIGHT ARTIFICIAL KNEE JOINT: ICD-10-CM

## 2023-11-29 PROCEDURE — 97110 THERAPEUTIC EXERCISES: CPT | Mod: GP,CQ

## 2023-11-29 ASSESSMENT — PAIN - FUNCTIONAL ASSESSMENT: PAIN_FUNCTIONAL_ASSESSMENT: 0-10

## 2023-11-29 ASSESSMENT — PAIN SCALES - GENERAL: PAINLEVEL_OUTOF10: 0 - NO PAIN

## 2023-11-29 NOTE — PROGRESS NOTES
"      Physical Therapy Treatment    Patient Name: Chaitanya Elizabeth  MRN: [id number]  Today's Date: 11/29/2023  Time Calculation  Start Time: 0916  Stop Time: 1000  Time Calculation (min): 44 min  Insurance   Visit:  6 of 12  Auth required: No  Provider: [facility]  Current Problem:  1. Acute pain of left knee            Subjective:  I feel pretty good,  I have been back working since the 11-20-23, I have noticed a little bit of swelling  Pain:  Pain Assessment: 0-10  Pain Score: 0 - No pain  Pain Location: Knee  Pain Orientation: Left    Objective:  AROM L knee  Ext 0   Flexion 112/125  Precautions  Precautions  Medical Precautions: No known precautions/limitation    Treatments:  Therapeutic Exercise 70295: Unit(s)-3  Supine QS with SLR 1x10 R/L  Supine SAQ 1x10 R/L with 3# ankle weight  Supine Bridge x 10 x 5\" hold  SL Hip Abd 1x10 R/L  Bike-Recumbent seat 11 x 5min   TG B LE Leg Press Level 7 3x10 SDR 5  TG Eccentric Lowering R/L 1x10 SDR 5 Level 4  Weight shifting Anterior R-L and L-R x10  Lateral step up /down 4\"block x 10 R/L  Manual Therapy 19907: Unit(s)  GR III Patellar Mobs Inf/Sup glide 2x10  PROM in supine x 10  Assessment: Challenged with session and experienced mild discomfort in knees and fatigue in LE  Plan: Continue to work on improving LE strength and function.  "

## 2023-11-30 RX ORDER — IBUPROFEN 800 MG/1
800 TABLET ORAL 3 TIMES DAILY
Qty: 90 TABLET | Refills: 0 | Status: SHIPPED | OUTPATIENT
Start: 2023-11-30 | End: 2023-12-30

## 2023-12-06 ENCOUNTER — TREATMENT (OUTPATIENT)
Dept: PHYSICAL THERAPY | Facility: CLINIC | Age: 58
End: 2023-12-06
Payer: COMMERCIAL

## 2023-12-06 DIAGNOSIS — M25.562 ACUTE PAIN OF LEFT KNEE: Primary | ICD-10-CM

## 2023-12-06 PROCEDURE — 97140 MANUAL THERAPY 1/> REGIONS: CPT | Mod: GP | Performed by: PHYSICAL THERAPIST

## 2023-12-06 PROCEDURE — 97110 THERAPEUTIC EXERCISES: CPT | Mod: GP | Performed by: PHYSICAL THERAPIST

## 2023-12-06 ASSESSMENT — PAIN - FUNCTIONAL ASSESSMENT: PAIN_FUNCTIONAL_ASSESSMENT: 0-10

## 2023-12-06 ASSESSMENT — PAIN SCALES - GENERAL: PAINLEVEL_OUTOF10: 0 - NO PAIN

## 2023-12-06 NOTE — PROGRESS NOTES
Physical Therapy Treatment    Patient Name: Chaitanya Elizabeth  MRN: [id number]  Today's Date: 12/6/2023  Time Calculation  Start Time: 0932  Stop Time: 1015  Time Calculation (min): 43 min    Insurance:  Visit:  7 of 12  Auth required: No  Provider: [facility]    Assessment:   Able to progress strengthening without increased sx.  Admits he needs to challenge himself more at home.  Educated on catching sensation in R knee likely due to need for continued strengthening.    Plan:   Progress to HEP.    Current Problem  1. Acute pain of left knee            Subjective   States he started having catching sensation in R knee over the last few weeks.    General  Reason for Referral: S/p LTKA  Referred By: Dr. Beatty  Precautions  Precautions  Medical Precautions: No known precautions/limitation  Pain  Pain Assessment: 0-10  Pain Score: 0 - No pain  Pain Location: Knee  Pain Orientation: Left    Objective   L knee ROM 0-126  R knee flexion 127    8wks post op (Tues)    TREATMENT  THERAPEUTIC EXERCISE:  Recumbent bike seat 12 random level 2 for 6mins  SL ER 10x  SLR with 2# ankle wt R/L 10x2 ea  TG L4 + 20# SL press 10x2  Alternating fwd lunges on BOSU 20x2  TRX squat 10x2  SLS with 3-way tap with Blue TB 10x ea    MANUAL THERAPY:  Patellar glides all planes gr III R/L    NEUROMUSCULAR RE-EDUCATION:      THERAPEUTIC ACTIVITY:      MODALITIES:      OP EDUCATION:   N/A    Goals:  1. Pain 0/10  2. Outcomes Measure:  LEFS 65/80  3. L knee ROM   4. LLE strength   5. Demonstrates independence with HEP.

## 2023-12-13 ENCOUNTER — APPOINTMENT (OUTPATIENT)
Dept: PHYSICAL THERAPY | Facility: CLINIC | Age: 58
End: 2023-12-13
Payer: COMMERCIAL

## 2024-02-13 ENCOUNTER — DOCUMENTATION (OUTPATIENT)
Dept: PHYSICAL THERAPY | Facility: CLINIC | Age: 59
End: 2024-02-13
Payer: COMMERCIAL

## 2024-02-13 NOTE — PROGRESS NOTES
Discharge Summary    Name: Chaitanya Elizabeth  MRN: [id number]  : 1965  Date: 24    Discharge Summary: PT    Discharge Information:   Date of discharge 24  Date of last visit 23  Date of evaluation 23  Number of attended visits 7  Referred by Dr Beatty  Referred for LTKA    Therapy Summary: Able to progress strength and ROM.    Discharge Status: Demonstrates ability to continue with independent home program.       Rehab Discharge Reason: Achieved all and/or the most significant goals(s)

## 2024-03-13 ENCOUNTER — HOSPITAL ENCOUNTER (OUTPATIENT)
Dept: RADIOLOGY | Facility: CLINIC | Age: 59
Discharge: HOME | End: 2024-03-13
Payer: COMMERCIAL

## 2024-03-13 ENCOUNTER — OFFICE VISIT (OUTPATIENT)
Dept: ORTHOPEDIC SURGERY | Facility: CLINIC | Age: 59
End: 2024-03-13
Payer: COMMERCIAL

## 2024-03-13 VITALS — WEIGHT: 238 LBS | BODY MASS INDEX: 35.25 KG/M2 | HEIGHT: 69 IN

## 2024-03-13 DIAGNOSIS — Z96.651 PRESENCE OF RIGHT ARTIFICIAL KNEE JOINT: ICD-10-CM

## 2024-03-13 DIAGNOSIS — M17.10 ARTHRITIS OF KNEE: ICD-10-CM

## 2024-03-13 DIAGNOSIS — Z96.652 STATUS POST TOTAL LEFT KNEE REPLACEMENT: ICD-10-CM

## 2024-03-13 PROCEDURE — 73562 X-RAY EXAM OF KNEE 3: CPT | Mod: LT

## 2024-03-13 PROCEDURE — 3008F BODY MASS INDEX DOCD: CPT | Performed by: ORTHOPAEDIC SURGERY

## 2024-03-13 PROCEDURE — 1036F TOBACCO NON-USER: CPT | Performed by: ORTHOPAEDIC SURGERY

## 2024-03-13 PROCEDURE — 73562 X-RAY EXAM OF KNEE 3: CPT | Mod: LEFT SIDE | Performed by: RADIOLOGY

## 2024-03-13 PROCEDURE — 73562 X-RAY EXAM OF KNEE 3: CPT | Mod: RT

## 2024-03-13 PROCEDURE — 99213 OFFICE O/P EST LOW 20 MIN: CPT | Performed by: ORTHOPAEDIC SURGERY

## 2024-03-13 PROCEDURE — 73562 X-RAY EXAM OF KNEE 3: CPT | Mod: RIGHT SIDE | Performed by: RADIOLOGY

## 2024-03-13 ASSESSMENT — PAIN - FUNCTIONAL ASSESSMENT: PAIN_FUNCTIONAL_ASSESSMENT: NO/DENIES PAIN

## 2024-03-14 RX ORDER — AZITHROMYCIN 500 MG/1
500 TABLET, FILM COATED ORAL ONCE
Qty: 1 TABLET | Refills: 2 | Status: SHIPPED | OUTPATIENT
Start: 2024-03-14 | End: 2024-03-14

## 2024-03-14 NOTE — PROGRESS NOTES
Happy with his progress.  Done with therapy.    Exam: Near full extension both knees.  Good flexion.  Good strength.  No crepitus.    I personally reviewed the following radiographic exams: Both knees shows well-fixed well aligned cemented total knee.    Assessment: Status post bilateral total knee.    Plan: Discussed antibiotic prophylaxis.  No restrictions.  Follow-up here 1 year postop x-rays both knees.

## 2024-10-16 ENCOUNTER — APPOINTMENT (OUTPATIENT)
Dept: ORTHOPEDIC SURGERY | Facility: CLINIC | Age: 59
End: 2024-10-16
Payer: COMMERCIAL

## 2024-10-16 ENCOUNTER — HOSPITAL ENCOUNTER (OUTPATIENT)
Dept: RADIOLOGY | Facility: CLINIC | Age: 59
Discharge: HOME | End: 2024-10-16
Payer: COMMERCIAL

## 2024-10-16 DIAGNOSIS — Z96.652 STATUS POST TOTAL LEFT KNEE REPLACEMENT: ICD-10-CM

## 2024-10-16 DIAGNOSIS — Z96.651 PRESENCE OF RIGHT ARTIFICIAL KNEE JOINT: ICD-10-CM

## 2024-10-16 DIAGNOSIS — M67.50 SYNOVIAL PLICA SYNDROME OF KNEE, UNSPECIFIED LATERALITY: Primary | ICD-10-CM

## 2024-10-16 PROCEDURE — 99213 OFFICE O/P EST LOW 20 MIN: CPT | Performed by: ORTHOPAEDIC SURGERY

## 2024-10-16 PROCEDURE — 73562 X-RAY EXAM OF KNEE 3: CPT | Mod: 50

## 2024-10-16 PROCEDURE — 1036F TOBACCO NON-USER: CPT | Performed by: ORTHOPAEDIC SURGERY

## 2024-10-16 PROCEDURE — 73562 X-RAY EXAM OF KNEE 3: CPT | Mod: BILATERAL PROCEDURE | Performed by: RADIOLOGY

## 2024-10-16 NOTE — PROGRESS NOTES
Returns for both knees.  Left knee doing very well.  Gets some clicking at the top of the right patella when he deeply flexes the knee for a while.  Is painful when he extends his knee.  Otherwise knee is doing okay with daily activity.    Exam: No effusion either knee.  Full range of motion both knees.  Palpable click with right knee fully flexed coming out to full extension.  No patellar apprehension.    I personally reviewed the following radiographic exams: Both knees shows well-fixed well aligned cemented total knees.    Assessment: Status post bilateral total knee.  Probable right suprapatellar plica.    Plan: Discussed nonoperative and operative options in detail.   Risk and benefits discussed in detail. All questions answered today.  Recovery timeline and expectations discussed in detail.  Discussed possible arthroscopic debridement if he really has significant pain.  Sounds like if he avoids deep flexion for a long period of time and does his regular activities he is not having an issue.  Will plan follow-up in 1 year.  Follow-up earlier if pain worsens.

## 2025-10-15 ENCOUNTER — APPOINTMENT (OUTPATIENT)
Dept: ORTHOPEDIC SURGERY | Facility: CLINIC | Age: 60
End: 2025-10-15
Payer: COMMERCIAL

## (undated) DEVICE — SUTURE, MONOCRYL, 4-0, 18 IN, PS2, UNDYED

## (undated) DEVICE — Device

## (undated) DEVICE — GLOVE, SURGICAL, PROTEXIS PI W/NEU-THERA, 8.0, PF, LF

## (undated) DEVICE — GLOVE, SURGICAL, PROTEXIS PI MICRO, 8.0, PF, LF

## (undated) DEVICE — HOOD, STERISHIELD T4 SYSTEM

## (undated) DEVICE — SUTURE, VICRYL, 1, 27 IN, CT-1, VIOLET

## (undated) DEVICE — BLADE, SAW, DUAL SAGITTAL, 1.9 X 90 X 30

## (undated) DEVICE — CUFF, TOURNIQUET, 30 X 4, DUAL PORT/SNGL BLADDER, DISP, LF

## (undated) DEVICE — HOOD, SURGICAL, FLYTE SURGICOOL

## (undated) DEVICE — SOLUTION, IRRI GATION, LACTATED RINGERS, 3000 ML, BAG

## (undated) DEVICE — DRESSING, MEPILEX BORDER, POST-OP AG, 4 X 10 IN

## (undated) DEVICE — SUTURE, VICRYL, 2-0, 27 IN, FSL, UNDYED

## (undated) DEVICE — BLADE, SAW, RECIPROCATING, DOUBLE-SIDED, 70 X 12.5 X 1 MM, STAINLESS STEEL